# Patient Record
Sex: FEMALE | Race: WHITE | NOT HISPANIC OR LATINO
[De-identification: names, ages, dates, MRNs, and addresses within clinical notes are randomized per-mention and may not be internally consistent; named-entity substitution may affect disease eponyms.]

---

## 2018-02-26 ENCOUNTER — RESULT REVIEW (OUTPATIENT)
Age: 43
End: 2018-02-26

## 2018-08-09 PROBLEM — Z00.00 ENCOUNTER FOR PREVENTIVE HEALTH EXAMINATION: Status: ACTIVE | Noted: 2018-08-09

## 2018-08-10 ENCOUNTER — APPOINTMENT (OUTPATIENT)
Dept: INTERVENTIONAL RADIOLOGY/VASCULAR | Facility: HOSPITAL | Age: 43
End: 2018-08-10
Payer: COMMERCIAL

## 2018-08-10 DIAGNOSIS — Z78.9 OTHER SPECIFIED HEALTH STATUS: ICD-10-CM

## 2018-08-10 DIAGNOSIS — Z87.898 PERSONAL HISTORY OF OTHER SPECIFIED CONDITIONS: ICD-10-CM

## 2018-08-10 PROCEDURE — 99244 OFF/OP CNSLTJ NEW/EST MOD 40: CPT

## 2018-08-13 PROBLEM — Z78.9 DOES NOT USE TOBACCO: Status: ACTIVE | Noted: 2018-08-13

## 2018-08-13 PROBLEM — Z78.9 DENIES ALCOHOL CONSUMPTION: Status: ACTIVE | Noted: 2018-08-13

## 2018-08-13 PROBLEM — Z87.898 FAMILY HISTORY OF UTERINE LEIOMYOMA: Status: ACTIVE | Noted: 2018-08-13

## 2018-09-06 ENCOUNTER — APPOINTMENT (OUTPATIENT)
Dept: INTERVENTIONAL RADIOLOGY/VASCULAR | Facility: HOSPITAL | Age: 43
End: 2018-09-06

## 2018-09-06 ENCOUNTER — OUTPATIENT (OUTPATIENT)
Dept: OUTPATIENT SERVICES | Facility: HOSPITAL | Age: 43
LOS: 1 days | End: 2018-09-06
Payer: COMMERCIAL

## 2018-09-06 PROCEDURE — 37243 VASC EMBOLIZE/OCCLUDE ORGAN: CPT

## 2018-09-06 PROCEDURE — C1894: CPT

## 2018-09-06 PROCEDURE — C1889: CPT

## 2018-09-06 PROCEDURE — C1887: CPT

## 2018-09-06 PROCEDURE — 36247 INS CATH ABD/L-EXT ART 3RD: CPT

## 2018-09-06 PROCEDURE — 36247 INS CATH ABD/L-EXT ART 3RD: CPT | Mod: 59

## 2018-09-06 PROCEDURE — C1769: CPT

## 2018-09-06 RX ORDER — IBUPROFEN 200 MG
1 TABLET ORAL
Qty: 60 | Refills: 0
Start: 2018-09-06 | End: 2018-09-20

## 2018-09-06 RX ORDER — ONDANSETRON 8 MG/1
1 TABLET, FILM COATED ORAL
Qty: 15 | Refills: 0
Start: 2018-09-06 | End: 2018-09-10

## 2018-09-06 RX ORDER — OXYCODONE HYDROCHLORIDE 5 MG/1
1 TABLET ORAL
Qty: 30 | Refills: 0
Start: 2018-09-06 | End: 2018-09-10

## 2018-10-08 ENCOUNTER — APPOINTMENT (OUTPATIENT)
Dept: INTERVENTIONAL RADIOLOGY/VASCULAR | Facility: HOSPITAL | Age: 43
End: 2018-10-08
Payer: COMMERCIAL

## 2018-10-08 PROCEDURE — 99213 OFFICE O/P EST LOW 20 MIN: CPT

## 2019-01-10 ENCOUNTER — FORM ENCOUNTER (OUTPATIENT)
Age: 44
End: 2019-01-10

## 2019-01-11 ENCOUNTER — APPOINTMENT (OUTPATIENT)
Dept: INTERVENTIONAL RADIOLOGY/VASCULAR | Facility: HOSPITAL | Age: 44
End: 2019-01-11
Payer: COMMERCIAL

## 2019-01-11 ENCOUNTER — APPOINTMENT (OUTPATIENT)
Dept: MRI IMAGING | Facility: HOSPITAL | Age: 44
End: 2019-01-11
Payer: COMMERCIAL

## 2019-01-11 ENCOUNTER — OUTPATIENT (OUTPATIENT)
Dept: OUTPATIENT SERVICES | Facility: HOSPITAL | Age: 44
LOS: 1 days | End: 2019-01-11
Payer: COMMERCIAL

## 2019-01-11 PROCEDURE — 72197 MRI PELVIS W/O & W/DYE: CPT | Mod: 26

## 2019-01-11 PROCEDURE — 99213 OFFICE O/P EST LOW 20 MIN: CPT

## 2019-01-11 PROCEDURE — A9585: CPT

## 2019-01-11 PROCEDURE — 72197 MRI PELVIS W/O & W/DYE: CPT

## 2019-01-11 NOTE — HISTORY OF PRESENT ILLNESS
[FreeTextEntry1] : four months since UAE.  Previous symptoms bulk related symptoms have improved significantly.  MRI shows only slight decrease in size of the posterior exophytic mass.  Discussed with patient possible need for surgical resection.  Spoke to Dr Jamal Chinchilla 1/11/2018 at 1:53  and discussed MRI findings.  He is seeing Ms Lopez today and will discuss resection.

## 2019-02-25 ENCOUNTER — APPOINTMENT (OUTPATIENT)
Dept: GYNECOLOGIC ONCOLOGY | Facility: CLINIC | Age: 44
End: 2019-02-25
Payer: COMMERCIAL

## 2019-02-25 VITALS
WEIGHT: 165 LBS | OXYGEN SATURATION: 96 % | HEIGHT: 62 IN | BODY MASS INDEX: 30.36 KG/M2 | HEART RATE: 71 BPM | SYSTOLIC BLOOD PRESSURE: 115 MMHG | DIASTOLIC BLOOD PRESSURE: 80 MMHG

## 2019-02-25 PROCEDURE — 99203 OFFICE O/P NEW LOW 30 MIN: CPT

## 2019-02-25 NOTE — HISTORY OF PRESENT ILLNESS
[FreeTextEntry1] : Problem\par 1)\par \par Previous Therapy\par 1)D&C 7/30/18\par    a)Endocervix- Mucus, blood, scant trips of squamous epithelium\par    b)Endometrium- Fragments of benign endocervix. Only occasional strips surface endometrium are present. Endometrial tissue is not represented well enough for evaluation in this material. \par 2)S/p Uterine Artery Embolization (UAE) by Dr. Soren Conway for menorrhagia 2' to leiomyomata 9/6/18\par 3)Pap 1/11/19\par    a)NILM, HPV/HR negative \par 4)Ca-125= 21\par    CEA= <0.5 both from 1/11/19\par \par 43 y.o referred by Dr. Chinchilla presents today for consultation on hysterectomy. \par \par

## 2019-02-25 NOTE — PAST MEDICAL HISTORY
[Menarche Age ____] : age at menarche was [unfilled] [Definite ___ (Date)] : the last menstrual period was [unfilled] [Total Preg ___] : G[unfilled] [FreeTextEntry5] : vaginal delivery x1\par  x 1

## 2019-02-25 NOTE — PHYSICAL EXAM
[Normal] : Recto-Vaginal Exam: Normal [Abnormal] : Uterus: Abnormal [Fully active, able to carry on all pre-disease performance without restriction] : Status 0 - Fully active, able to carry on all pre-disease performance without restriction [de-identified] : low transverse incision.

## 2019-10-30 ENCOUNTER — APPOINTMENT (OUTPATIENT)
Dept: GYNECOLOGIC ONCOLOGY | Facility: CLINIC | Age: 44
End: 2019-10-30
Payer: COMMERCIAL

## 2019-10-30 PROCEDURE — 99215 OFFICE O/P EST HI 40 MIN: CPT

## 2019-10-31 LAB
ALBUMIN SERPL ELPH-MCNC: 5.3 G/DL
ALP BLD-CCNC: 38 U/L
ALT SERPL-CCNC: 11 U/L
ANION GAP SERPL CALC-SCNC: 18 MMOL/L
APTT BLD: 32.8 SEC
AST SERPL-CCNC: 16 U/L
BASOPHILS # BLD AUTO: 0.1 K/UL
BASOPHILS NFR BLD AUTO: 1.5 %
BILIRUB SERPL-MCNC: 0.5 MG/DL
BUN SERPL-MCNC: 13 MG/DL
CALCIUM SERPL-MCNC: 10.2 MG/DL
CHLORIDE SERPL-SCNC: 97 MMOL/L
CO2 SERPL-SCNC: 23 MMOL/L
CREAT SERPL-MCNC: 0.7 MG/DL
EOSINOPHIL # BLD AUTO: 0.31 K/UL
EOSINOPHIL NFR BLD AUTO: 4.5 %
GLUCOSE SERPL-MCNC: 97 MG/DL
HCT VFR BLD CALC: 44.6 %
HGB BLD-MCNC: 13.9 G/DL
IMM GRANULOCYTES NFR BLD AUTO: 0.3 %
INR PPP: 0.98 RATIO
LYMPHOCYTES # BLD AUTO: 2.28 K/UL
LYMPHOCYTES NFR BLD AUTO: 33.1 %
MAN DIFF?: NORMAL
MCHC RBC-ENTMCNC: 29.6 PG
MCHC RBC-ENTMCNC: 31.2 GM/DL
MCV RBC AUTO: 94.9 FL
MONOCYTES # BLD AUTO: 0.51 K/UL
MONOCYTES NFR BLD AUTO: 7.4 %
NEUTROPHILS # BLD AUTO: 3.66 K/UL
NEUTROPHILS NFR BLD AUTO: 53.2 %
PLATELET # BLD AUTO: 257 K/UL
POTASSIUM SERPL-SCNC: 4.2 MMOL/L
PROT SERPL-MCNC: 7.8 G/DL
PT BLD: 11.3 SEC
RBC # BLD: 4.7 M/UL
RBC # FLD: 12.7 %
SODIUM SERPL-SCNC: 138 MMOL/L
WBC # FLD AUTO: 6.88 K/UL

## 2019-11-05 NOTE — PHYSICAL EXAM
[Normal] : Recto-Vaginal Exam: Normal [Fully active, able to carry on all pre-disease performance without restriction] : Status 0 - Fully active, able to carry on all pre-disease performance without restriction [Mass ___ cm] : found to have a [unfilled] ~Ucm palpable mass [Abnormal] : Adnexa(ae): Abnormal [de-identified] : low transverse incision.

## 2019-11-05 NOTE — HISTORY OF PRESENT ILLNESS
[FreeTextEntry1] : Problem\par 1)Fibroids\par 2) Pelvic Mass\par \par Previous Therapy\par 1)D&C 7/30/18\par    a)Endocervix- Mucus, blood, scant trips of squamous epithelium\par    b)Endometrium- Fragments of benign endocervix. Only occasional strips surface endometrium are present. Endometrial tissue is not represented well enough for evaluation in this material. \par 2)S/p Uterine Artery Embolization (UAE) by Dr. Soren Conway for menorrhagia 2' to leiomyomata 9/6/18\par 3)Pap 1/11/19\par    a)NILM, HPV/HR negative \par 4)Ca-125= 21\par    CEA= <0.5 both from 1/11/19\par 5) [place] 3/[place]\par 6) CT Pelvis 10/15/19\par     a) large cystic structure in the L adnexa 10cm\par \par Here for follow up. Previously seen for fibroids, patient underwent myomectomy in [facility]. Now referred due to large adnexal mass. She has been feeling well for the most part and has been monitored closely with ultrasounds. 3 months ago she had bilateral small cysts but now has a large L ovarian cyst. Patient has some bloating and occasional abdominal pain and dyspareunia. Feeling well. \par

## 2019-11-05 NOTE — DISCUSSION/SUMMARY
[FreeTextEntry1] : I discussed with the patient with the aid of diagrams, reviewed the findings on history and physical examination, and reviewed the imaging studies in detail.  We reviewed the cystic and solid component of the mass, the CA-125, and other tumor markers. ACOG management of adnexal masses has been reviewed. \par \par We discussed the differential diagnosis which includes benign, low malignant potential tumors and malignancy. Other etiologies of adnexal masses, such as metastatic disease from another organ sites also discussed.\par \par In the case of symptomatic cysts, or those with findings concerning for malignancy, the recommendation is for surgical removal. Again, with the aid of diagrams, different surgical approaches were discussed including minimally invasive and open approaches.\par \par Laparoscopic cystectomy vs. unilateral salpingoophorectomy discussed. Increased risk of cyst rupture with ovarian cystectomy explained. If findings grossly concerning for malignancy a frozen section will be performed; and the appropriate additional management including but not limited to pelvic and paraaortic lymph node dissection, omentectomy and appendectomy. It was explained that in the case of a high-grade malignancy removal of the uterus and other ovary is recommended. NCCN guidelines discussed.  Possible laparotomy also discussed. If diagnosis is not clear on frozen section, we will avoid any additional procedures and defer to permanent pathology. This may require additional surgery and the patient states understanding this. \par \par Complications that include, but are not limited to: bleeding, infection, injury to other organs including bowel, bladder, ureters, blood vessels, nerves; infections, blood clots, lymphedema, pneumonia, wound complications and prolonged hospital stay have all been discussed with the patient. Whenever minimally invasive surgery is attempted, there is a chance of needing to convert to laparotomy. Staging procedure discussed in detail in the event of a malignant/abnormal finding.  The risk of occult injury requiring additional surgery also discussed. I have also provided her with the diagrams.  \par \par Surgical scheduling was discussed and instructions for optimization prior to surgery were given. will follow the Enhanced Recovery After Surgery (ERAS) protocol.  \par No aspirin or NSAID products for 1 week prior. \par She will choose a surgical date.\par Medical clearance. \par \par The total encounter time was 60  minutes, of which over 50% was spent face-to-face, examining and counseling the patient, or coordinating care.

## 2019-11-25 ENCOUNTER — APPOINTMENT (OUTPATIENT)
Dept: GYNECOLOGIC ONCOLOGY | Facility: CLINIC | Age: 44
End: 2019-11-25
Payer: COMMERCIAL

## 2019-11-25 VITALS
BODY MASS INDEX: 28.34 KG/M2 | HEART RATE: 86 BPM | OXYGEN SATURATION: 97 % | SYSTOLIC BLOOD PRESSURE: 110 MMHG | WEIGHT: 154 LBS | HEIGHT: 62 IN | DIASTOLIC BLOOD PRESSURE: 80 MMHG

## 2019-11-25 PROCEDURE — 99215 OFFICE O/P EST HI 40 MIN: CPT

## 2019-11-25 NOTE — DISCUSSION/SUMMARY
[FreeTextEntry1] : Spoke with patient and answered questions to her satisfaction\par Will proceed with diagnostic laparoscopy, bilateral salpingectomy, USO\par Possible laparotomy and possible surgical staging discussed in detail\par If cancer is found, patient desires complete staging at time of surgery\par

## 2019-11-25 NOTE — HISTORY OF PRESENT ILLNESS
[FreeTextEntry1] : Problem\par 1)Fibroids\par 2) Pelvic Mass\par \par Previous Therapy\par 1)D&C 7/30/18\par    a)Endocervix- Mucus, blood, scant trips of squamous epithelium\par    b)Endometrium- Fragments of benign endocervix. Only occasional strips surface endometrium are present. Endometrial tissue is not represented well enough for evaluation in this material. \par 2)S/p Uterine Artery Embolization (UAE) by Dr. Soren Conway for menorrhagia 2' to leiomyomata 9/6/18\par 3)Pap 1/11/19\par    a)NILM, HPV/HR negative \par 4)Ca-125= 21\par    CEA= <0.5 both from 1/11/19\par 5) [place] 3/[place]\par 6) CT Pelvis 10/15/19\par     a) large cystic structure in the L adnexa 10cm\par \par Here to discuss surgery. Has more questions regarding approach and recovery. \par

## 2019-11-26 LAB
CANCER AG125 SERPL-ACNC: 9 U/ML
CEA SERPL-MCNC: 0.9 NG/ML

## 2019-12-04 NOTE — ASU PATIENT PROFILE, ADULT - PSH
History of appendectomy    History of     History of foot surgery  right as a child  History of left knee surgery    History of myomectomy    History of surgery  embolization of fibroids

## 2019-12-05 ENCOUNTER — OUTPATIENT (OUTPATIENT)
Dept: OUTPATIENT SERVICES | Facility: HOSPITAL | Age: 44
LOS: 1 days | Discharge: HOME | End: 2019-12-05
Payer: COMMERCIAL

## 2019-12-05 ENCOUNTER — APPOINTMENT (OUTPATIENT)
Dept: GYNECOLOGIC ONCOLOGY | Facility: HOSPITAL | Age: 44
End: 2019-12-05

## 2019-12-05 VITALS
OXYGEN SATURATION: 98 % | SYSTOLIC BLOOD PRESSURE: 115 MMHG | DIASTOLIC BLOOD PRESSURE: 62 MMHG | RESPIRATION RATE: 18 BRPM

## 2019-12-05 VITALS
SYSTOLIC BLOOD PRESSURE: 116 MMHG | OXYGEN SATURATION: 100 % | TEMPERATURE: 99 F | HEART RATE: 97 BPM | WEIGHT: 145.73 LBS | HEIGHT: 62 IN | DIASTOLIC BLOOD PRESSURE: 79 MMHG | RESPIRATION RATE: 18 BRPM

## 2019-12-05 DIAGNOSIS — Z98.890 OTHER SPECIFIED POSTPROCEDURAL STATES: Chronic | ICD-10-CM

## 2019-12-05 DIAGNOSIS — Z98.891 HISTORY OF UTERINE SCAR FROM PREVIOUS SURGERY: Chronic | ICD-10-CM

## 2019-12-05 DIAGNOSIS — Z90.49 ACQUIRED ABSENCE OF OTHER SPECIFIED PARTS OF DIGESTIVE TRACT: Chronic | ICD-10-CM

## 2019-12-05 LAB — GLUCOSE BLDC GLUCOMTR-MCNC: 103 MG/DL — HIGH (ref 70–99)

## 2019-12-05 PROCEDURE — 58660 LAPAROSCOPY LYSIS: CPT | Mod: 22

## 2019-12-05 RX ORDER — GABAPENTIN 400 MG/1
600 CAPSULE ORAL ONCE
Refills: 0 | Status: COMPLETED | OUTPATIENT
Start: 2019-12-05 | End: 2019-12-05

## 2019-12-05 RX ORDER — HYDROMORPHONE HYDROCHLORIDE 2 MG/ML
0.5 INJECTION INTRAMUSCULAR; INTRAVENOUS; SUBCUTANEOUS EVERY 4 HOURS
Refills: 0 | Status: DISCONTINUED | OUTPATIENT
Start: 2019-12-05 | End: 2019-12-06

## 2019-12-05 RX ORDER — OXYCODONE AND ACETAMINOPHEN 5; 325 MG/1; MG/1
1 TABLET ORAL EVERY 4 HOURS
Refills: 0 | Status: DISCONTINUED | OUTPATIENT
Start: 2019-12-05 | End: 2019-12-06

## 2019-12-05 RX ORDER — METOCLOPRAMIDE HCL 10 MG
10 TABLET ORAL ONCE
Refills: 0 | Status: DISCONTINUED | OUTPATIENT
Start: 2019-12-05 | End: 2019-12-06

## 2019-12-05 RX ORDER — METOCLOPRAMIDE HCL 10 MG
10 TABLET ORAL ONCE
Refills: 0 | Status: COMPLETED | OUTPATIENT
Start: 2019-12-05 | End: 2019-12-05

## 2019-12-05 RX ORDER — CELECOXIB 200 MG/1
400 CAPSULE ORAL ONCE
Refills: 0 | Status: COMPLETED | OUTPATIENT
Start: 2019-12-05 | End: 2019-12-05

## 2019-12-05 RX ORDER — ACETAMINOPHEN 500 MG
1000 TABLET ORAL ONCE
Refills: 0 | Status: COMPLETED | OUTPATIENT
Start: 2019-12-05 | End: 2019-12-05

## 2019-12-05 RX ORDER — ONDANSETRON 8 MG/1
4 TABLET, FILM COATED ORAL EVERY 6 HOURS
Refills: 0 | Status: DISCONTINUED | OUTPATIENT
Start: 2019-12-05 | End: 2019-12-06

## 2019-12-05 RX ORDER — KETOROLAC TROMETHAMINE 30 MG/ML
30 SYRINGE (ML) INJECTION ONCE
Refills: 0 | Status: DISCONTINUED | OUTPATIENT
Start: 2019-12-05 | End: 2019-12-06

## 2019-12-05 RX ORDER — HEPARIN SODIUM 5000 [USP'U]/ML
5000 INJECTION INTRAVENOUS; SUBCUTANEOUS ONCE
Refills: 0 | Status: COMPLETED | OUTPATIENT
Start: 2019-12-05 | End: 2019-12-05

## 2019-12-05 RX ADMIN — Medication 1000 MILLIGRAM(S): at 12:00

## 2019-12-05 RX ADMIN — CELECOXIB 400 MILLIGRAM(S): 200 CAPSULE ORAL at 12:00

## 2019-12-05 RX ADMIN — Medication 1000 MILLIGRAM(S): at 16:05

## 2019-12-05 RX ADMIN — Medication 10 MILLIGRAM(S): at 19:57

## 2019-12-05 RX ADMIN — HEPARIN SODIUM 5000 UNIT(S): 5000 INJECTION INTRAVENOUS; SUBCUTANEOUS at 12:00

## 2019-12-05 RX ADMIN — CELECOXIB 400 MILLIGRAM(S): 200 CAPSULE ORAL at 16:05

## 2019-12-05 RX ADMIN — GABAPENTIN 600 MILLIGRAM(S): 400 CAPSULE ORAL at 12:00

## 2019-12-05 NOTE — BRIEF OPERATIVE NOTE - OPERATION/FINDINGS
Significant adhesions between bowel and posterior surface of uterus.  Omental adhesions to anterior abdominal wall.  Lysis of adhesions performed in left pelvic sidewall revealing cystic collection of clear fluid formed by abdominal adhesions; no cyst wall.  Within this cavity normal left ovary and tube were visualized.  Right ovary and tube not visualized due to bowel adhesions, however normal on preoperative imaging.

## 2019-12-05 NOTE — PACU DISCHARGE NOTE - COMMENTS
Pt discharged to home. Discharge paperwork and instructions given to pt and pt's daughter. Iv heplock removed. Safety protocol maintained.

## 2019-12-05 NOTE — PROGRESS NOTE ADULT - ASSESSMENT
44y Female POD# 0  s/p CYN and evacuation of pelvic cytic fluid                                        1. Neuro/Pain: per PACU  2  CV:   VS per routine  3. Pulm: Encourage ISS  4. GI: sips and ADAT, zofran and reglan in pacu prn for nausea   5. : voiding  6. Heme: ---  7. ID: --  8. DVT ppx: SCDs, early ambulation  9. Dispo: Likely POD#0 form PACU

## 2019-12-05 NOTE — PROGRESS NOTE ADULT - SUBJECTIVE AND OBJECTIVE BOX
GYN Post Op Check     Patient seen at bedside. Reporting intermittent nausea after eating crackers with one episode of emesis 2 hours ago, no s/p zofran and feeling better. Patient would lke some more anti emetics prior to getting in car. Tolerating ice chips now.     Pain controlled. Tolerating chips and sips.  Ambulating and voiding     Denies CP, palpitations, SOB, fever, chills, nausea, vomiting.    Vital Signs Last 24 Hrs  T(C): 37.1 (05 Dec 2019 15:05), Max: 37.1 (05 Dec 2019 11:37)  T(F): 98.7 (05 Dec 2019 15:05), Max: 98.7 (05 Dec 2019 11:37)  HR: 82 (05 Dec 2019 17:17) (60 - 97)  BP: 115/62 (05 Dec 2019 18:33) (110/60 - 128/67)  BP(mean): 79 (05 Dec 2019 17:17) (78 - 91)  RR: 18 (05 Dec 2019 18:33) (17 - 20)  SpO2: 98% (05 Dec 2019 18:33) (98% - 100%)    Physical Exam:  Gen: No Acute Distress  Pulm: Clear to auscultation bilaterally  GI: soft, appropriately tender, mildly distended, decreased BS, no rebound, no guarding.  Dressing C/D/I  : Leonard in place  Ext: SCDs in place, wnl    I&O's Summary    MEDICATIONS  (STANDING):  metoclopramide Injectable 10 milliGRAM(s) IV Push once    MEDICATIONS  (PRN):  HYDROmorphone  Injectable 0.5 milliGRAM(s) IV Push every 4 hours PRN Severe Pain (7 - 10)  ketorolac   Injectable 30 milliGRAM(s) IV Push once PRN Moderate Pain (4 - 6)  metoclopramide Injectable 10 milliGRAM(s) IV Push once PRN Nausea and/or Vomiting  ondansetron Injectable 4 milliGRAM(s) IV Push every 6 hours PRN Nausea  oxycodone    5 mG/acetaminophen 325 mG 1 Tablet(s) Oral every 4 hours PRN Moderate Pain (4 - 6)    Allergies    No Known Allergies    Intolerances        LABS:

## 2019-12-06 PROBLEM — D21.9 BENIGN NEOPLASM OF CONNECTIVE AND OTHER SOFT TISSUE, UNSPECIFIED: Chronic | Status: ACTIVE | Noted: 2019-12-04

## 2019-12-06 PROBLEM — R19.00 INTRA-ABDOMINAL AND PELVIC SWELLING, MASS AND LUMP, UNSPECIFIED SITE: Chronic | Status: ACTIVE | Noted: 2019-12-04

## 2019-12-06 PROBLEM — E78.5 HYPERLIPIDEMIA, UNSPECIFIED: Chronic | Status: ACTIVE | Noted: 2019-12-04

## 2019-12-06 PROCEDURE — 49329 UNLSTD LAPS PX ABD PERTM&OMN: CPT

## 2019-12-06 PROCEDURE — 82962 GLUCOSE BLOOD TEST: CPT

## 2019-12-06 PROCEDURE — 86901 BLOOD TYPING SEROLOGIC RH(D): CPT

## 2019-12-06 PROCEDURE — 86850 RBC ANTIBODY SCREEN: CPT

## 2019-12-06 PROCEDURE — 86900 BLOOD TYPING SEROLOGIC ABO: CPT

## 2019-12-12 ENCOUNTER — APPOINTMENT (OUTPATIENT)
Dept: GYNECOLOGIC ONCOLOGY | Facility: CLINIC | Age: 44
End: 2019-12-12
Payer: COMMERCIAL

## 2019-12-12 VITALS
HEIGHT: 62 IN | SYSTOLIC BLOOD PRESSURE: 117 MMHG | DIASTOLIC BLOOD PRESSURE: 84 MMHG | BODY MASS INDEX: 27.97 KG/M2 | WEIGHT: 152 LBS

## 2019-12-12 PROCEDURE — 99024 POSTOP FOLLOW-UP VISIT: CPT

## 2019-12-12 NOTE — HISTORY OF PRESENT ILLNESS
[FreeTextEntry1] : Problem\par 1)Fibroids\par 2) Pelvic Mass\par \par Previous Therapy\par 1)D&C 7/30/18\par    a)Endocervix- Mucus, blood, scant trips of squamous epithelium\par    b)Endometrium- Fragments of benign endocervix. Only occasional strips surface endometrium are present. Endometrial tissue is not represented well enough for evaluation in this material. \par 2)S/p Uterine Artery Embolization (UAE) by Dr. Soren Conway for menorrhagia 2' to leiomyomata 9/6/18\par 3)Pap 1/11/19\par    a)NILM, HPV/HR negative \par 4)Ca-125= 21\par    CEA= <0.5 both from 1/11/19\par 5) [place] 3[place]\par 6) CT Pelvis 10/15/19\par     a) large cystic structure in the L adnexa 10cm\par 7) PATHOLOGY PENDING \par \par \par

## 2019-12-12 NOTE — PHYSICAL EXAM
[Fully active, able to carry on all pre-disease performance without restriction] : Status 0 - Fully active, able to carry on all pre-disease performance without restriction [Normal] : Description of patient's judgment and insight: Normal [de-identified] : C [de-identified] : CTA BL, no wheezing or rales BL [de-identified] : Normal s1 and s2 [de-identified] : Incision sites healing well. No signs of infection. +BS, soft, non tender

## 2019-12-12 NOTE — REVIEW OF SYSTEMS
[Negative] : Musculoskeletal [Syncope] : no syncope noted [Hematuria] : no hematuria [Dysuria] : no dysuria [Vaginal Discharge] : no vaginal discharge [Abn Vag Bleeding] : no abnormal vaginal bleeding [Chest Pain] : no chest pain [Orthopnea] : no orthopnea [Wheezing] : no wheezing [CANTRELL] : no dyspnea on exertion [SOB on Exertion] : no shortness of breath during exertion [Bloody Stools] : no bloody stools [Nausea] : no nausea/vomitting

## 2019-12-12 NOTE — REASON FOR VISIT
[FreeTextEntry1] : 1 week post op check. Pt reports she is feeling well. Pt denies fever, chills or abdominal pain. Pt reports she is currently not taking anything for pain.

## 2019-12-12 NOTE — DISCUSSION/SUMMARY
[FreeTextEntry1] : -Pathology results still pending. Pt notified that we will call her asap with the results\par -Patient healing well\par -Postoperative precautions given\par -Follow up in 3 weeks for vaginal cuff evaluation

## 2020-01-06 ENCOUNTER — APPOINTMENT (OUTPATIENT)
Dept: GYNECOLOGIC ONCOLOGY | Facility: CLINIC | Age: 45
End: 2020-01-06

## 2020-01-06 ENCOUNTER — APPOINTMENT (OUTPATIENT)
Dept: GYNECOLOGIC ONCOLOGY | Facility: CLINIC | Age: 45
End: 2020-01-06
Payer: COMMERCIAL

## 2020-01-06 VITALS
DIASTOLIC BLOOD PRESSURE: 70 MMHG | HEIGHT: 62 IN | WEIGHT: 152 LBS | HEART RATE: 77 BPM | OXYGEN SATURATION: 96 % | SYSTOLIC BLOOD PRESSURE: 110 MMHG | BODY MASS INDEX: 27.97 KG/M2

## 2020-01-06 PROCEDURE — 99024 POSTOP FOLLOW-UP VISIT: CPT

## 2020-01-06 RX ORDER — OXYCODONE AND ACETAMINOPHEN 5; 325 MG/1; MG/1
5-325 TABLET ORAL
Qty: 15 | Refills: 0 | Status: COMPLETED | COMMUNITY
Start: 2019-12-04 | End: 2020-01-06

## 2020-01-06 RX ORDER — DOCUSATE SODIUM 100 MG/1
100 CAPSULE ORAL TWICE DAILY
Qty: 60 | Refills: 0 | Status: COMPLETED | COMMUNITY
Start: 2019-12-04 | End: 2020-01-06

## 2020-01-06 RX ORDER — IBUPROFEN 800 MG/1
800 TABLET, FILM COATED ORAL 3 TIMES DAILY
Qty: 45 | Refills: 0 | Status: COMPLETED | COMMUNITY
Start: 2019-12-04 | End: 2020-01-06

## 2020-01-06 NOTE — PHYSICAL EXAM
[Fully active, able to carry on all pre-disease performance without restriction] : Status 0 - Fully active, able to carry on all pre-disease performance without restriction [Normal] : Bimanual Exam: Normal [de-identified] : CTA BL, no wheezing or rales BL [de-identified] : Normal s1 and s2

## 2020-01-06 NOTE — REVIEW OF SYSTEMS
[Negative] : Musculoskeletal [Syncope] : no syncope noted [Hematuria] : no hematuria [Dysuria] : no dysuria [Vaginal Discharge] : no vaginal discharge [Abn Vag Bleeding] : no abnormal vaginal bleeding [Chest Pain] : no chest pain [Orthopnea] : no orthopnea [CANTRELL] : no dyspnea on exertion [Wheezing] : no wheezing [SOB on Exertion] : no shortness of breath during exertion [Bloody Stools] : no bloody stools [Nausea] : no nausea/vomitting

## 2020-08-10 ENCOUNTER — APPOINTMENT (OUTPATIENT)
Dept: GYNECOLOGIC ONCOLOGY | Facility: CLINIC | Age: 45
End: 2020-08-10
Payer: COMMERCIAL

## 2020-08-10 ENCOUNTER — TRANSCRIPTION ENCOUNTER (OUTPATIENT)
Age: 45
End: 2020-08-10

## 2020-08-10 VITALS
HEIGHT: 62 IN | HEART RATE: 82 BPM | OXYGEN SATURATION: 98 % | WEIGHT: 147 LBS | BODY MASS INDEX: 27.05 KG/M2 | SYSTOLIC BLOOD PRESSURE: 102 MMHG | DIASTOLIC BLOOD PRESSURE: 72 MMHG

## 2020-08-10 DIAGNOSIS — D25.9 LEIOMYOMA OF UTERUS, UNSPECIFIED: ICD-10-CM

## 2020-08-10 DIAGNOSIS — Z98.890 OTHER SPECIFIED POSTPROCEDURAL STATES: ICD-10-CM

## 2020-08-10 DIAGNOSIS — R19.00 INTRA-ABDOMINAL AND PELVIC SWELLING, MASS AND LUMP, UNSPECIFIED SITE: ICD-10-CM

## 2020-08-10 DIAGNOSIS — L72.0 EPIDERMAL CYST: ICD-10-CM

## 2020-08-10 PROCEDURE — 99214 OFFICE O/P EST MOD 30 MIN: CPT

## 2020-08-10 NOTE — PHYSICAL EXAM
[Normal] : Bimanual Exam: Normal [Fully active, able to carry on all pre-disease performance without restriction] : Status 0 - Fully active, able to carry on all pre-disease performance without restriction [Abnormal] : Adnexa(ae): Abnormal [de-identified] : CTA BL, no wheezing or rales BL [de-identified] : healed LPSY and transverse incision [de-identified] : Normal s1 and s2

## 2020-08-10 NOTE — DISCUSSION/SUMMARY
[Discuss Alternatives/Risks/Benefits w/Patient] : All alternatives, risks, and benefits were discussed with the patient/family and all questions were answered.  Patient expressed good understanding and appreciates the importance of follow up as recommended. [FreeTextEntry1] : PROBABLE PERITONEAL INCLUSION CYST IN A PATIENT WHO HAS PRIOR SURGERY AND ENDOMETRIOSIS\par \par -PATIENT will need gull hysterectomy if she is to have surgery bc her adhesions are veery severe, she is likely to have recurrence of her inclusion cysts.  \par -my recommendation is for the above if she is symptomatic.\par -if she is not symptomatic she can expectantly manage.\par -she will need body imaging prior to any surgery.\par -patient is leaning toward surgery but is afraid to do it now with covid and so she will use expectant management for now

## 2020-08-10 NOTE — HISTORY OF PRESENT ILLNESS
[FreeTextEntry1] : Problem\par 1)Fibroids\par 2) Pelvic Mass\par \par Previous Therapy\par 1)D&C 7/30/18\par    a)Endocervix- Mucus, blood, scant trips of squamous epithelium\par    b)Endometrium- Fragments of benign endocervix. Only occasional strips surface endometrium are present. Endometrial tissue is not represented well enough for evaluation in this material. \par 2)S/p Uterine Artery Embolization (UAE) by Dr. Soren Conway for menorrhagia 2' to leiomyomata 9/6/18\par 3)Pap 1/11/19\par    a)NILM, HPV/HR negative \par 4)Ca-125= 21\par    CEA= <0.5 both from 1/11/19\par 5) [place] 3[place]\par 6) CT Pelvis 10/15/19\par     a) large cystic structure in the L adnexa 10cm\par 7) Laparoscopic lysis of adhesions and evacuation of peritoneal inclusion cyst\par    a) no pathology sent\par \par Here for follow up, she saw Dr. Salas who did an in office US and referred her back her as the inclusion cyst is back and the same size. Records not available. Patient is feeling well but has pain with sex and bending. \par \par \par

## 2020-08-10 NOTE — REVIEW OF SYSTEMS
[Negative] : Musculoskeletal [Hematuria] : no hematuria [Syncope] : no syncope noted [Dysuria] : no dysuria [Abn Vag Bleeding] : no abnormal vaginal bleeding [Vaginal Discharge] : no vaginal discharge [Chest Pain] : no chest pain [Normal Sexual Function] : abnormal sexual function [CANTRELL] : no dyspnea on exertion [Orthopnea] : no orthopnea [Wheezing] : no wheezing [Bloody Stools] : no bloody stools [SOB on Exertion] : no shortness of breath during exertion [Nausea] : no nausea/vomitting [FreeTextEntry4] : pain with beding and sex

## 2022-01-02 NOTE — ASU PATIENT PROFILE, ADULT - PT NEEDS ASSIST
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 Patient currently takes Lamotrigine ER 400mg qd and also takes Vyvanse 30mg qAM PRN for when she has episodes of hypersomnia. Current episode started in the evening of 1/1/2022, prompting call to Neurology and send-in for IV solumedrol treatment, that has worked in the past (see 6/2021 documentation of similar presentation). Parents deny any recent travel or sick contacts, URI symptoms, vomiting, diarrhea, rashes, etc. Endorses these episodes to present with similar semiology each time and this episode is consistent with her usual attacks. Patient was able to take Vyvanse this AM and brought in for infusion.   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 She receives infusion every 3 months in varied settings, including at home or in ER. Last session was in October.    Received 1 g Solumedrol and GI PPX.   no

## 2023-09-27 NOTE — PHYSICAL EXAM
Detail Level: Detailed Detail Level: Zone [Mass ___ cm] : found to have a [unfilled] ~Ucm palpable mass [Abnormal] : Adnexa(ae): Abnormal [Normal] : Recto-Vaginal Exam: Normal [Fully active, able to carry on all pre-disease performance without restriction] : Status 0 - Fully active, able to carry on all pre-disease performance without restriction [de-identified] : low transverse incision.

## 2023-10-30 ENCOUNTER — APPOINTMENT (OUTPATIENT)
Dept: GYNECOLOGIC ONCOLOGY | Facility: CLINIC | Age: 48
End: 2023-10-30

## 2023-11-29 ENCOUNTER — TRANSCRIPTION ENCOUNTER (OUTPATIENT)
Age: 48
End: 2023-11-29

## 2023-11-29 NOTE — ASU PATIENT PROFILE, ADULT - NSICDXPASTMEDICALHX_GEN_ALL_CORE_FT
PAST MEDICAL HISTORY:  Fibroid     Hashimoto's thyroiditis     HLD (hyperlipidemia)     Pelvic mass

## 2023-11-29 NOTE — ASU PATIENT PROFILE, ADULT - FALL HARM RISK - HARM RISK INTERVENTIONS
Patient Education   Personalized Prevention Plan  You are due for the preventive services outlined below.  Your care team is available to assist you in scheduling these services.  If you have already completed any of these items, please share that information with your care team to update in your medical record.  Health Maintenance Due   Topic Date Due    Heart Failure Action Plan  Never done    Migraine Action Plan  Never done    Zoster (Shingles) Vaccine (1 of 2) Never done    Annual Wellness Visit  12/11/2021    FALL RISK ASSESSMENT  12/11/2021    ANNUAL REVIEW OF HM ORDERS  12/16/2021    Cholesterol Lab  12/17/2021    Basic Metabolic Panel  02/14/2022     Diclofenac gel            Communicate Risk of Fall with Harm to all staff/Reinforce activity limits and safety measures with patient and family/Tailored Fall Risk Interventions/Visual Cue: Yellow wristband and red socks/Bed in lowest position, wheels locked, appropriate side rails in place/Call bell, personal items and telephone in reach/Instruct patient to call for assistance before getting out of bed or chair/Non-slip footwear when patient is out of bed/[place] to call system/Physically safe environment - no spills, clutter or unnecessary equipment/Purposeful Proactive Rounding/Room/bathroom lighting operational, light cord in reach

## 2023-11-29 NOTE — ASU PATIENT PROFILE, ADULT - NSICDXPASTSURGICALHX_GEN_ALL_CORE_FT
PAST SURGICAL HISTORY:  History of appendectomy     History of      History of foot surgery right as a child    History of left knee surgery     History of myomectomy x2    History of surgery embolization of fibroids

## 2023-11-30 ENCOUNTER — TRANSCRIPTION ENCOUNTER (OUTPATIENT)
Age: 48
End: 2023-11-30

## 2023-11-30 ENCOUNTER — INPATIENT (INPATIENT)
Facility: HOSPITAL | Age: 48
LOS: 1 days | Discharge: HOME | DRG: 743 | End: 2023-12-02
Attending: OBSTETRICS & GYNECOLOGY | Admitting: OBSTETRICS & GYNECOLOGY
Payer: COMMERCIAL

## 2023-11-30 VITALS
RESPIRATION RATE: 16 BRPM | OXYGEN SATURATION: 97 % | TEMPERATURE: 97 F | WEIGHT: 163.58 LBS | HEIGHT: 61 IN | HEART RATE: 89 BPM | SYSTOLIC BLOOD PRESSURE: 116 MMHG | DIASTOLIC BLOOD PRESSURE: 78 MMHG

## 2023-11-30 DIAGNOSIS — N83.201 UNSPECIFIED OVARIAN CYST, RIGHT SIDE: ICD-10-CM

## 2023-11-30 DIAGNOSIS — Z98.890 OTHER SPECIFIED POSTPROCEDURAL STATES: Chronic | ICD-10-CM
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
## 2023-11-30 DIAGNOSIS — Z79.890 HORMONE REPLACEMENT THERAPY: ICD-10-CM
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
## 2023-11-30 DIAGNOSIS — N94.89 OTHER SPECIFIED CONDITIONS ASSOCIATED WITH FEMALE GENITAL ORGANS AND MENSTRUAL CYCLE: ICD-10-CM

## 2023-11-30 DIAGNOSIS — G89.29 OTHER CHRONIC PAIN: ICD-10-CM
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
## 2023-11-30 DIAGNOSIS — S05.00XA INJURY OF CONJUNCTIVA AND CORNEAL ABRASION WITHOUT FOREIGN BODY, UNSPECIFIED EYE, INITIAL ENCOUNTER: ICD-10-CM

## 2023-11-30 DIAGNOSIS — E78.5 HYPERLIPIDEMIA, UNSPECIFIED: ICD-10-CM

## 2023-11-30 DIAGNOSIS — E06.3 AUTOIMMUNE THYROIDITIS: ICD-10-CM

## 2023-11-30 DIAGNOSIS — Z90.49 ACQUIRED ABSENCE OF OTHER SPECIFIED PARTS OF DIGESTIVE TRACT: Chronic | ICD-10-CM

## 2023-11-30 DIAGNOSIS — Z98.891 HISTORY OF UTERINE SCAR FROM PREVIOUS SURGERY: Chronic | ICD-10-CM

## 2023-11-30 DIAGNOSIS — R10.2 PELVIC AND PERINEAL PAIN: ICD-10-CM

## 2023-11-30 DIAGNOSIS — N73.6 FEMALE PELVIC PERITONEAL ADHESIONS (POSTINFECTIVE): ICD-10-CM

## 2023-11-30 DIAGNOSIS — N80.03 ADENOMYOSIS OF THE UTERUS: ICD-10-CM

## 2023-11-30 DIAGNOSIS — Z90.49 ACQUIRED ABSENCE OF OTHER SPECIFIED PARTS OF DIGESTIVE TRACT: ICD-10-CM

## 2023-11-30 DIAGNOSIS — K66.8 OTHER SPECIFIED DISORDERS OF PERITONEUM: ICD-10-CM

## 2023-11-30 DIAGNOSIS — T80.89XA OTHER COMPLICATIONS FOLLOWING INFUSION, TRANSFUSION AND THERAPEUTIC INJECTION, INITIAL ENCOUNTER: ICD-10-CM

## 2023-11-30 LAB
BLD GP AB SCN SERPL QL: NEGATIVE — SIGNIFICANT CHANGE UP
BLD GP AB SCN SERPL QL: NEGATIVE — SIGNIFICANT CHANGE UP
RH IG SCN BLD-IMP: POSITIVE — SIGNIFICANT CHANGE UP
RH IG SCN BLD-IMP: POSITIVE — SIGNIFICANT CHANGE UP

## 2023-11-30 PROCEDURE — 88305 TISSUE EXAM BY PATHOLOGIST: CPT | Mod: 26

## 2023-11-30 PROCEDURE — 88307 TISSUE EXAM BY PATHOLOGIST: CPT | Mod: 26

## 2023-11-30 PROCEDURE — 88304 TISSUE EXAM BY PATHOLOGIST: CPT | Mod: 26

## 2023-11-30 RX ORDER — METOCLOPRAMIDE HCL 10 MG
10 TABLET ORAL ONCE
Refills: 0 | Status: DISCONTINUED | OUTPATIENT
Start: 2023-11-30 | End: 2023-11-30

## 2023-11-30 RX ORDER — HEPARIN SODIUM 5000 [USP'U]/ML
5000 INJECTION INTRAVENOUS; SUBCUTANEOUS EVERY 12 HOURS
Refills: 0 | Status: DISCONTINUED | OUTPATIENT
Start: 2023-11-30 | End: 2023-11-30

## 2023-11-30 RX ORDER — ATORVASTATIN CALCIUM 80 MG/1
20 TABLET, FILM COATED ORAL AT BEDTIME
Refills: 0 | Status: DISCONTINUED | OUTPATIENT
Start: 2023-11-30 | End: 2023-12-02

## 2023-11-30 RX ORDER — OXYCODONE HYDROCHLORIDE 5 MG/1
10 TABLET ORAL EVERY 4 HOURS
Refills: 0 | Status: DISCONTINUED | OUTPATIENT
Start: 2023-11-30 | End: 2023-12-02

## 2023-11-30 RX ORDER — KETOROLAC TROMETHAMINE 30 MG/ML
15 SYRINGE (ML) INJECTION EVERY 6 HOURS
Refills: 0 | Status: DISCONTINUED | OUTPATIENT
Start: 2023-11-30 | End: 2023-12-01

## 2023-11-30 RX ORDER — HYDROMORPHONE HYDROCHLORIDE 2 MG/ML
0.5 INJECTION INTRAMUSCULAR; INTRAVENOUS; SUBCUTANEOUS
Refills: 0 | Status: DISCONTINUED | OUTPATIENT
Start: 2023-11-30 | End: 2023-12-02

## 2023-11-30 RX ORDER — ATORVASTATIN CALCIUM 80 MG/1
1 TABLET, FILM COATED ORAL
Qty: 0 | Refills: 0 | DISCHARGE

## 2023-11-30 RX ORDER — ACETAMINOPHEN 500 MG
1000 TABLET ORAL EVERY 6 HOURS
Refills: 0 | Status: DISCONTINUED | OUTPATIENT
Start: 2023-11-30 | End: 2023-12-02

## 2023-11-30 RX ORDER — CELECOXIB 200 MG/1
400 CAPSULE ORAL ONCE
Refills: 0 | Status: COMPLETED | OUTPATIENT
Start: 2023-11-30 | End: 2023-11-30

## 2023-11-30 RX ORDER — FENTANYL CITRATE 50 UG/ML
25 INJECTION INTRAVENOUS
Refills: 0 | Status: DISCONTINUED | OUTPATIENT
Start: 2023-11-30 | End: 2023-11-30

## 2023-11-30 RX ORDER — SODIUM CHLORIDE 9 MG/ML
1000 INJECTION, SOLUTION INTRAVENOUS
Refills: 0 | Status: DISCONTINUED | OUTPATIENT
Start: 2023-11-30 | End: 2023-11-30

## 2023-11-30 RX ORDER — LEVOTHYROXINE SODIUM 125 MCG
125 TABLET ORAL DAILY
Refills: 0 | Status: DISCONTINUED | OUTPATIENT
Start: 2023-11-30 | End: 2023-12-02

## 2023-11-30 RX ORDER — HEPARIN SODIUM 5000 [USP'U]/ML
5000 INJECTION INTRAVENOUS; SUBCUTANEOUS ONCE
Refills: 0 | Status: COMPLETED | OUTPATIENT
Start: 2023-11-30 | End: 2023-11-30

## 2023-11-30 RX ORDER — ACETAMINOPHEN 500 MG
1000 TABLET ORAL ONCE
Refills: 0 | Status: COMPLETED | OUTPATIENT
Start: 2023-11-30 | End: 2023-11-30

## 2023-11-30 RX ORDER — OXYCODONE HYDROCHLORIDE 5 MG/1
10 TABLET ORAL ONCE
Refills: 0 | Status: DISCONTINUED | OUTPATIENT
Start: 2023-11-30 | End: 2023-11-30

## 2023-11-30 RX ORDER — SODIUM CHLORIDE 9 MG/ML
1000 INJECTION, SOLUTION INTRAVENOUS
Refills: 0 | Status: DISCONTINUED | OUTPATIENT
Start: 2023-11-30 | End: 2023-12-01

## 2023-11-30 RX ORDER — ACETAMINOPHEN 500 MG
2 TABLET ORAL
Qty: 0 | Refills: 0 | DISCHARGE
Start: 2023-11-30

## 2023-11-30 RX ORDER — LEVOTHYROXINE SODIUM 125 MCG
1 TABLET ORAL
Refills: 0 | DISCHARGE

## 2023-11-30 RX ORDER — OFLOXACIN 0.3 %
1 DROPS OPHTHALMIC (EYE)
Refills: 0 | Status: COMPLETED | OUTPATIENT
Start: 2023-11-30 | End: 2023-12-01

## 2023-11-30 RX ORDER — SIMETHICONE 80 MG/1
80 TABLET, CHEWABLE ORAL EVERY 6 HOURS
Refills: 0 | Status: DISCONTINUED | OUTPATIENT
Start: 2023-11-30 | End: 2023-12-02

## 2023-11-30 RX ORDER — PANTOPRAZOLE SODIUM 20 MG/1
20 TABLET, DELAYED RELEASE ORAL DAILY
Refills: 0 | Status: DISCONTINUED | OUTPATIENT
Start: 2023-11-30 | End: 2023-12-02

## 2023-11-30 RX ORDER — METOCLOPRAMIDE HCL 10 MG
10 TABLET ORAL EVERY 8 HOURS
Refills: 0 | Status: DISCONTINUED | OUTPATIENT
Start: 2023-11-30 | End: 2023-12-02

## 2023-11-30 RX ORDER — ONDANSETRON 8 MG/1
8 TABLET, FILM COATED ORAL EVERY 8 HOURS
Refills: 0 | Status: DISCONTINUED | OUTPATIENT
Start: 2023-11-30 | End: 2023-12-02

## 2023-11-30 RX ORDER — SPIRONOLACTONE 25 MG/1
1 TABLET, FILM COATED ORAL
Refills: 0 | DISCHARGE

## 2023-11-30 RX ORDER — FENTANYL CITRATE 50 UG/ML
50 INJECTION INTRAVENOUS
Refills: 0 | Status: DISCONTINUED | OUTPATIENT
Start: 2023-11-30 | End: 2023-11-30

## 2023-11-30 RX ORDER — HYDROMORPHONE HYDROCHLORIDE 2 MG/ML
0.5 INJECTION INTRAMUSCULAR; INTRAVENOUS; SUBCUTANEOUS
Refills: 0 | Status: DISCONTINUED | OUTPATIENT
Start: 2023-11-30 | End: 2023-11-30

## 2023-11-30 RX ORDER — HEPARIN SODIUM 5000 [USP'U]/ML
5000 INJECTION INTRAVENOUS; SUBCUTANEOUS EVERY 12 HOURS
Refills: 0 | Status: DISCONTINUED | OUTPATIENT
Start: 2023-12-01 | End: 2023-12-02

## 2023-11-30 RX ORDER — OXYCODONE HYDROCHLORIDE 5 MG/1
5 TABLET ORAL EVERY 4 HOURS
Refills: 0 | Status: DISCONTINUED | OUTPATIENT
Start: 2023-11-30 | End: 2023-12-02

## 2023-11-30 RX ADMIN — HYDROMORPHONE HYDROCHLORIDE 0.5 MILLIGRAM(S): 2 INJECTION INTRAMUSCULAR; INTRAVENOUS; SUBCUTANEOUS at 14:22

## 2023-11-30 RX ADMIN — SODIUM CHLORIDE 125 MILLILITER(S): 9 INJECTION, SOLUTION INTRAVENOUS at 13:36

## 2023-11-30 RX ADMIN — Medication 15 MILLIGRAM(S): at 17:14

## 2023-11-30 RX ADMIN — Medication 1 DROP(S): at 14:17

## 2023-11-30 RX ADMIN — ATORVASTATIN CALCIUM 20 MILLIGRAM(S): 80 TABLET, FILM COATED ORAL at 21:44

## 2023-11-30 RX ADMIN — Medication 1000 MILLIGRAM(S): at 06:46

## 2023-11-30 RX ADMIN — HYDROMORPHONE HYDROCHLORIDE 0.5 MILLIGRAM(S): 2 INJECTION INTRAMUSCULAR; INTRAVENOUS; SUBCUTANEOUS at 14:37

## 2023-11-30 RX ADMIN — Medication 1 DROP(S): at 23:50

## 2023-11-30 RX ADMIN — SIMETHICONE 80 MILLIGRAM(S): 80 TABLET, CHEWABLE ORAL at 17:13

## 2023-11-30 RX ADMIN — Medication 15 MILLIGRAM(S): at 23:50

## 2023-11-30 RX ADMIN — Medication 1 DROP(S): at 17:14

## 2023-11-30 RX ADMIN — Medication 1000 MILLIGRAM(S): at 17:13

## 2023-11-30 RX ADMIN — PANTOPRAZOLE SODIUM 20 MILLIGRAM(S): 20 TABLET, DELAYED RELEASE ORAL at 14:07

## 2023-11-30 RX ADMIN — CELECOXIB 400 MILLIGRAM(S): 200 CAPSULE ORAL at 06:46

## 2023-11-30 RX ADMIN — Medication 1000 MILLIGRAM(S): at 23:49

## 2023-11-30 RX ADMIN — SIMETHICONE 80 MILLIGRAM(S): 80 TABLET, CHEWABLE ORAL at 23:50

## 2023-11-30 RX ADMIN — Medication 1 DROP(S): at 15:08

## 2023-11-30 RX ADMIN — HEPARIN SODIUM 5000 UNIT(S): 5000 INJECTION INTRAVENOUS; SUBCUTANEOUS at 06:46

## 2023-11-30 NOTE — BRIEF OPERATIVE NOTE - NSICDXBRIEFPOSTOP_GEN_ALL_CORE_FT
POST-OP DIAGNOSIS:  Endometriosis 30-Nov-2023 12:14:10  Mitzi Deng  
POST-OP DIAGNOSIS:  Endometriosis 30-Nov-2023 12:14:10  Mitzi Deng  Peritoneal cyst 30-Nov-2023 14:41:01  Umesh Sierra  Right ovarian cyst 30-Nov-2023 14:41:08  Umesh Sierra

## 2023-11-30 NOTE — DISCHARGE NOTE PROVIDER - HOSPITAL COURSE
Patient underwent an uncomplicated RA TLH, BS, R ovarian cystectomy, L peritoneal inclusion cystectomy, flex sigmoidoscopy for endometriosis and adenomyosis. LORETTA drain placed. Pt had swelling of her R hand d/t infiltrated IV. She also has a corneal abrasion. Patient’s postoperative course was unremarkable and she remained hemodynamically stable and afebrile throughout. Upon discharge on POD#_, the patient is ambulating and voiding spontaneously, tolerating oral intake, pain was well controlled with oral medication, and vital signs were stable.    Vital Signs Last 24 Hrs  T(C): 37.1 (30 Nov 2023 16:33), Max: 37.1 (30 Nov 2023 16:33)  T(F): 98.7 (30 Nov 2023 16:33), Max: 98.7 (30 Nov 2023 16:33)  HR: 98 (30 Nov 2023 16:33) (76 - 98)  BP: 115/73 (30 Nov 2023 16:33) (104/58 - 148/63)  BP(mean): 84 (30 Nov 2023 16:00) (76 - 91)  RR: 16 (30 Nov 2023 16:33) (16 - 23)  SpO2: 94% (30 Nov 2023 16:33) (92% - 98%)    Parameters below as of 30 Nov 2023 16:33  Patient On (Oxygen Delivery Method): room air       Patient underwent an uncomplicated RA TLH, BS, R ovarian cystectomy, L peritoneal inclusion cystectomy, flex sigmoidoscopy for endometriosis and adenomyosis. LORETTA drain placed. Pt had swelling of her R hand d/t infiltrated IV. She also had a corneal abrasion that resolved with Oxaflocin on POD1.. Patient’s postoperative course was unremarkable and she remained hemodynamically stable and afebrile throughout. Upon discharge on POD#2, the patient is ambulating and voiding spontaneously, tolerating oral intake, pain was well controlled with oral medication, and vital signs were stable.    Vital Signs Last 24 Hrs  T(C): 37.1 (30 Nov 2023 16:33), Max: 37.1 (30 Nov 2023 16:33)  T(F): 98.7 (30 Nov 2023 16:33), Max: 98.7 (30 Nov 2023 16:33)  HR: 98 (30 Nov 2023 16:33) (76 - 98)  BP: 115/73 (30 Nov 2023 16:33) (104/58 - 148/63)  BP(mean): 84 (30 Nov 2023 16:00) (76 - 91)  RR: 16 (30 Nov 2023 16:33) (16 - 23)  SpO2: 94% (30 Nov 2023 16:33) (92% - 98%)    Parameters below as of 30 Nov 2023 16:33  Patient On (Oxygen Delivery Method): room air

## 2023-11-30 NOTE — DISCHARGE NOTE PROVIDER - CARE PROVIDER_API CALL
Tristen Soares  Obstetrics and Gynecology  [address] [place] [address], [address] [address]  Phone: [phone]  Fax: [phone]  Follow Up Time:

## 2023-11-30 NOTE — CHART NOTE - NSCHARTNOTEFT_GEN_A_CORE
Informed by Dr. Salgado and RN patient c/o right eye discomfort that started in PACU.  Denies use of contact lens / denies previous eye surgeries.  Right eye with moderate epiphora, scant scleral erythema, mild conjunctival edema. Eyelid opened and flushed with sterile NS without change in symptoms. No foreign bodies/inverted lashes appreciated.  Tetracaine ophthalmic drop administered to right eye with relief of symptoms. Discussed with patient that this is diagnostic and she likely has a right corneal abrasion.   Informed of treatment plan, notified to inform providers if no improvement by tomorrow as she will require ophthalmology referral - verbalized understanding.  Plan  No further tetracaine as this can impede healing/cause irritation  Ofloxacin ophthalmic drop to right eye 4x/day for one day  If no change by tomorrow or worsening symptoms, will require ophthalmology consultation  GYN team member Christin informed of above and plan

## 2023-11-30 NOTE — H&P ADULT - ASSESSMENT
49yo P2 presents for scheduled RA TLH, BS for chronic pelvic pain, adenomyosis, endometriosis.  - consents signed  - will proceed w/ scheduled case  - d/w Dr. Soares

## 2023-11-30 NOTE — PROGRESS NOTE ADULT - ASSESSMENT
49yo s/p scheduled ANDRÉS ROGERS, BS, gens surg (Sudha) performed significant CYN, excision of L peritoneal 14cm simple cyst, R ovarian cystectomy, flex sig w/ air leak test, LORETTA drain placement for endometriosis and adenomyosis, chronic pelvic pain    Neuro: tylenol/toradol ATC, oxy prn  CV: VSS, Atorvastatin 20mg qd   Pulm: RA  GI: Sips and chips, /hr, zofran/reglan PRN, simethicone ATC, diet per gen surg -/-  GYN: s/p ANDRÉS ROGERS, CHERRY  : carvajal  Heme: 13.9>  Endo: Levothyroxine 125mcg qd  VTE ppx: SCDs, SQH to start 12/1 AM  lines/drains: R abd LORETTA drain (11/30-)    [ ] AM cbc, bmp, mg, phos ordered  [ ] POC 17;00  [ ] carvajal out and heplock in AM  [ ] SQH bid starting 12/1 morning   [ ] keep R arm elevated w/ warm compresses for IV infiltration intra-op  [ ]Restart Spiroonolactone 25mg if needed

## 2023-11-30 NOTE — BRIEF OPERATIVE NOTE - NSICDXBRIEFPREOP_GEN_ALL_CORE_FT
Attempted to reach patient's daughter to complete a hospital follow up call.  No answer, voicemail full.  
PRE-OP DIAGNOSIS:  Peritoneal cyst 30-Nov-2023 12:13:58  Mitzi Deng  Endometriosis 30-Nov-2023 12:14:04  Mitzi Deng  
PRE-OP DIAGNOSIS:  Peritoneal cyst 30-Nov-2023 12:13:58  Mitzi Deng  Endometriosis 30-Nov-2023 12:14:04  Mitzi Deng

## 2023-11-30 NOTE — BRIEF OPERATIVE NOTE - OPERATION/FINDINGS
- Co-managed case performed by both OBGYN and team 4 general surgery.  - Robotic approach taken with Kota cutdown for initial camera port followed by 2 robotic ports.  - Extensive lysis of adhesions performed to remove uterine attachments to colon and peritoneum.  - After uterine lysed from colonic attachments, protctoscopy performed to ensure no leak or perforation.  - Two large cysts identified, 18cm peritoneal cyst as well as smaller approximately 8cm cyst on contralateral side. Cysts ruptured and abdomen irrigated, contents of cyst appeared to be simple in nature, not purulent, bloody, or reflective of endometrioma  - OBGYN performed their portion of the procedure  - Prior to closing, flexible sigmoidoscopy performed to confirm no leakage or perforation, air bubble test negative.  - 19F [place] drain placed in the pelvis  - Incisions closed in layers  - No complications encountered, hemostasis achieved.
s/p RA TLMAYANK, BS. Surgery (Sudha) performed significant CYN, excision of L peritoneal 14cm simple cyst, R ovarian cystectomy, flex sig w/ air leak test, LORETTA drain placement for endometriosis and adenomyosis, chronic pelvic pain. Dense adhesions between peritoneal cyst and sigmoid colon and posterior uterus. Also adhesions between peritoneal cyst and b/l adnexa. Peritoneal cyst w/ serous appearing fluid. Uterus w/ some bogginess and slightly enlarged to about 10w. R fallopian tube dilated. Flex sig w/ air leak test w/o evidence of sigmoid defect. Cysto w/ b/l urine jets noted from b/l ureteral orifices.

## 2023-11-30 NOTE — DISCHARGE NOTE PROVIDER - NSDCCPCAREPLAN_GEN_ALL_CORE_FT
PRINCIPAL DISCHARGE DIAGNOSIS  Diagnosis: Endometriosis  Assessment and Plan of Treatment:       SECONDARY DISCHARGE DIAGNOSES  Diagnosis: Uterus, adenomyosis  Assessment and Plan of Treatment:

## 2023-11-30 NOTE — PROGRESS NOTE ADULT - SUBJECTIVE AND OBJECTIVE BOX
GYN POC    Pt seen and examined at bedside. Pt complains of mild abdominal pain.   Pt denies any fever, chills, chest pain, SOB, nausea or vomiting. Tolerating ice chips.     T(F): 98.7 (11-30-23 @ 16:33), Max: 98.7 (11-30-23 @ 16:33)  HR: 98 (11-30-23 @ 16:33) (76 - 98)  BP: 115/73 (11-30-23 @ 16:33) (104/58 - 148/63)  RR: 16 (11-30-23 @ 16:33) (16 - 23)  SpO2: 94% (11-30-23 @ 16:33) (92% - 98%)  Wt(kg): --    11-30 @ 07:01  -  11-30 @ 17:35  --------------------------------------------------------  IN: 500 mL / OUT: 1005 mL / NET: -505 mL        acetaminophen     Tablet .. 1000 milliGRAM(s) Oral every 6 hours  atorvastatin 20 milliGRAM(s) Oral at bedtime  HYDROmorphone  Injectable 0.5 milliGRAM(s) IV Push every 15 minutes PRN breakthrough pain in the PACU  ketorolac   Injectable 15 milliGRAM(s) IV Push every 6 hours  lactated ringers. 1000 milliLiter(s) IV Continuous <Continuous>  levothyroxine 125 MICROGram(s) Oral daily  metoclopramide Injectable 10 milliGRAM(s) IV Push every 8 hours PRN Nausea and/or Vomiting  ofloxacin 0.3% Solution 1 Drop(s) Right EYE four times a day  ondansetron Injectable 8 milliGRAM(s) IV Push every 8 hours PRN Nausea and/or Vomiting  oxyCODONE    IR 10 milliGRAM(s) Oral every 4 hours PRN Severe Pain (7 - 10)  oxyCODONE    IR 5 milliGRAM(s) Oral every 4 hours PRN Moderate Pain (4 - 6)  pantoprazole  Injectable 20 milliGRAM(s) IV Push daily  simethicone 80 milliGRAM(s) Chew every 6 hours      Physical exam:  Constitutional: NAD  Pulmonary: no increased WOB  Abdomen: incision site clean, dry and intact. LORETTA sanguinous. Soft, mildly tender, nondistended  Extremities: no lower extremity edema, or calf tenderness. SCDs in place   GYN POC    Pt seen and examined at bedside. Pt complains of mild abdominal pain.   Pt denies any fever, chills, chest pain, SOB, nausea or vomiting. Tolerating ice chips.     T(F): 98.7 (11-30-23 @ 16:33), Max: 98.7 (11-30-23 @ 16:33)  HR: 98 (11-30-23 @ 16:33) (76 - 98)  BP: 115/73 (11-30-23 @ 16:33) (104/58 - 148/63)  RR: 16 (11-30-23 @ 16:33) (16 - 23)  SpO2: 94% (11-30-23 @ 16:33) (92% - 98%)  Wt(kg): --    11-30 @ 07:01  -  11-30 @ 17:35  --------------------------------------------------------  IN: 500 mL / OUT: 1005 mL / NET: -505 mL        acetaminophen     Tablet .. 1000 milliGRAM(s) Oral every 6 hours  atorvastatin 20 milliGRAM(s) Oral at bedtime  HYDROmorphone  Injectable 0.5 milliGRAM(s) IV Push every 15 minutes PRN breakthrough pain in the PACU  ketorolac   Injectable 15 milliGRAM(s) IV Push every 6 hours  lactated ringers. 1000 milliLiter(s) IV Continuous <Continuous>  levothyroxine 125 MICROGram(s) Oral daily  metoclopramide Injectable 10 milliGRAM(s) IV Push every 8 hours PRN Nausea and/or Vomiting  ofloxacin 0.3% Solution 1 Drop(s) Right EYE four times a day  ondansetron Injectable 8 milliGRAM(s) IV Push every 8 hours PRN Nausea and/or Vomiting  oxyCODONE    IR 10 milliGRAM(s) Oral every 4 hours PRN Severe Pain (7 - 10)  oxyCODONE    IR 5 milliGRAM(s) Oral every 4 hours PRN Moderate Pain (4 - 6)  pantoprazole  Injectable 20 milliGRAM(s) IV Push daily  simethicone 80 milliGRAM(s) Chew every 6 hours      Physical exam:  Constitutional: NAD  Pulmonary: no increased WOB  Abdomen: incision site clean, dry and intact. LORETTA sanguinous. Soft, mildly tender, nondistended  Extremities: no lower extremity edema, or calf tenderness. RUE significantly swollen. SCDs in place

## 2023-11-30 NOTE — H&P ADULT - HISTORY OF PRESENT ILLNESS
47yo P2 presents for scheduled RA TLH, BS for chronic pelvic pain, adenomyosis, endometriosis.    Obhx:    Gynhx: endometriosis, adenomyosis  PMH: hashimoto's, HLD  PSH: abd myomectomy x2, l/s ovarian cystectomy  meds; synthroid 125mcg qd, spironolactone 25mg qd, atorvastatin 20mg qd  all: nkda  socail: denies

## 2023-11-30 NOTE — BRIEF OPERATIVE NOTE - NSICDXBRIEFPROCEDURE_GEN_ALL_CORE_FT
PROCEDURES:  Total vaginal hysterectomy with cystoscopy 30-Nov-2023 12:13:08  Mitzi Deng  
PROCEDURES:  Robot-assisted laparoscopic total hysterectomy with bilateral salpingectomy and cystoscopy using da Esperanza Xi 30-Nov-2023 14:40:24  Umesh Sierra  Lysis of pelvic adhesions 30-Nov-2023 14:40:39  Umesh Sierra

## 2023-11-30 NOTE — CHART NOTE - NSCHARTNOTEFT_GEN_A_CORE
***** INCOMPLETE 11/30 ******    SUBJECTIVE: Patient seen and examined bedside by chief resident. JI since procedure. Endorses good pain control with medication. Denies nausea/vomiting tolerating ice chips and sips in PACU. Denies flatus and bowel movements. No F/C.     I&O's Detail    30 Nov 2023 07:01  -  30 Nov 2023 16:11  --------------------------------------------------------  IN:    Lactated Ringers: 500 mL  Total IN: 500 mL    OUT:    Bulb (mL): 250 mL    Indwelling Catheter - Urethral (mL): 605 mL  Total OUT: 855 mL    Total NET: -355 mL          Vital Signs Last 24 Hrs  T(C): 37 (30 Nov 2023 15:00), Max: 37 (30 Nov 2023 15:00)  T(F): 98.6 (30 Nov 2023 15:00), Max: 98.6 (30 Nov 2023 15:00)  HR: 90 (30 Nov 2023 16:00) (76 - 98)  BP: 115/64 (30 Nov 2023 16:00) (104/58 - 148/63)  BP(mean): 84 (30 Nov 2023 16:00) (76 - 91)  RR: 21 (30 Nov 2023 16:00) (16 - 23)  SpO2: 96% (30 Nov 2023 16:00) (92% - 98%)    Parameters below as of 30 Nov 2023 16:00  Patient On (Oxygen Delivery Method): room air    General: NAD, resting comfortably in bed  C/V: NSR  Pulm: Nonlabored breathing, no respiratory distress  Abd: soft, diffusely NT/ ND, midline low abdominal incision c/d/i. Mild incisional tenderness   Extrem: WWP, no edema, SCDs in place    49yo s/p scheduled RA TLH, BS, gens surg (Sudha) performed significant CYN, excision of L peritoneal 14cm simple cyst, R ovarian cystectomy, flex sig w/ air leak test, LORETTA drain placement for endometriosis and adenomyosis, chronic pelvic pain. Dense adhesions in posterior cul-de-sac between the 14cm peritoneal cyst, sigmoid, posterior uterus, and b/l adnexa.     Patient's postoperative check was within normal limits. Continue excellent care per primary team  - NPO with ice chips and sips until re-evaluation tomorrow morning  - OOB as tolerated/ IS/ SCDs  - Rest of care per primary SUBJECTIVE: Patient seen and examined bedside by chief resident. JI since procedure. Endorses good pain control with medication. Denies nausea/vomiting tolerating ice chips and sips in PACU. Denies flatus and bowel movements. No F/C. Patient is complaining of some right sided eye irritation since the procedure.     I&O's Detail    30 Nov 2023 07:01  -  30 Nov 2023 16:11  --------------------------------------------------------  IN:    Lactated Ringers: 500 mL  Total IN: 500 mL    OUT:    Bulb (mL): 250 mL    Indwelling Catheter - Urethral (mL): 605 mL  Total OUT: 855 mL    Total NET: -355 mL          Vital Signs Last 24 Hrs  T(C): 37 (30 Nov 2023 15:00), Max: 37 (30 Nov 2023 15:00)  T(F): 98.6 (30 Nov 2023 15:00), Max: 98.6 (30 Nov 2023 15:00)  HR: 90 (30 Nov 2023 16:00) (76 - 98)  BP: 115/64 (30 Nov 2023 16:00) (104/58 - 148/63)  BP(mean): 84 (30 Nov 2023 16:00) (76 - 91)  RR: 21 (30 Nov 2023 16:00) (16 - 23)  SpO2: 96% (30 Nov 2023 16:00) (92% - 98%)    Parameters below as of 30 Nov 2023 16:00  Patient On (Oxygen Delivery Method): room air    General: NAD, resting comfortably in bed  C/V: NSR  Pulm: Nonlabored breathing, no respiratory distress  Abd: soft, diffusely NT/ ND, 3 incisions c/d/i Mild incisional tenderness. LORETTA drain in place holding suction with SS output  Extrem: WWP, no edema, SCDs in place    49yo s/p scheduled RA TLH, BS, gens surg (Sudha) performed significant CYN, excision of L peritoneal 14cm simple cyst, R ovarian cystectomy, flex sig w/ air leak test, LORETTA drain placement for endometriosis and adenomyosis, chronic pelvic pain. Dense adhesions in posterior cul-de-sac between the 14cm peritoneal cyst, sigmoid, posterior uterus, and b/l adnexa.     Patient's postoperative check was within normal limits. Continue excellent care per primary team  - NPO with ice chips and sips until re-evaluation tomorrow morning  - OOB as tolerated/ IS/ SCDs  - Hold off evening dose of SQH given high output from drain  - Rest of care per primary

## 2023-11-30 NOTE — DISCHARGE NOTE PROVIDER - NSDCCPTREATMENT_GEN_ALL_CORE_FT
PRINCIPAL PROCEDURE  Procedure: Robot-assisted laparoscopic total hysterectomy with bilateral salpingectomy and cystoscopy using da Esperanza Xi  Findings and Treatment:       SECONDARY PROCEDURE  Procedure: Lysis of pelvic adhesions  Findings and Treatment:     Procedure: Cystectomy, ovarian, laparoscopic  Findings and Treatment: robot assisted

## 2023-11-30 NOTE — DISCHARGE NOTE PROVIDER - NSDCMRMEDTOKEN_GEN_ALL_CORE_FT
acetaminophen 500 mg oral tablet: 2 tab(s) orally every 6 hours  atorvastatin 20 mg oral tablet: 1 tab(s) orally once a day  spironolactone 25 mg oral tablet: 1 tab(s) orally once a day  Synthroid 125 mcg (0.125 mg) oral tablet: 1 tab(s) orally once a day

## 2023-12-01 LAB
ANION GAP SERPL CALC-SCNC: 9 MMOL/L — SIGNIFICANT CHANGE UP (ref 5–17)
ANION GAP SERPL CALC-SCNC: 9 MMOL/L — SIGNIFICANT CHANGE UP (ref 5–17)
BASOPHILS # BLD AUTO: 0.07 K/UL — SIGNIFICANT CHANGE UP (ref 0–0.2)
BASOPHILS # BLD AUTO: 0.07 K/UL — SIGNIFICANT CHANGE UP (ref 0–0.2)
BASOPHILS NFR BLD AUTO: 1 % — SIGNIFICANT CHANGE UP (ref 0–2)
BASOPHILS NFR BLD AUTO: 1 % — SIGNIFICANT CHANGE UP (ref 0–2)
BUN SERPL-MCNC: 10 MG/DL — SIGNIFICANT CHANGE UP (ref 7–23)
BUN SERPL-MCNC: 10 MG/DL — SIGNIFICANT CHANGE UP (ref 7–23)
CALCIUM SERPL-MCNC: 8.9 MG/DL — SIGNIFICANT CHANGE UP (ref 8.4–10.5)
CALCIUM SERPL-MCNC: 8.9 MG/DL — SIGNIFICANT CHANGE UP (ref 8.4–10.5)
CHLORIDE SERPL-SCNC: 107 MMOL/L — SIGNIFICANT CHANGE UP (ref 96–108)
CHLORIDE SERPL-SCNC: 107 MMOL/L — SIGNIFICANT CHANGE UP (ref 96–108)
CO2 SERPL-SCNC: 26 MMOL/L — SIGNIFICANT CHANGE UP (ref 22–31)
CO2 SERPL-SCNC: 26 MMOL/L — SIGNIFICANT CHANGE UP (ref 22–31)
CREAT SERPL-MCNC: 0.68 MG/DL — SIGNIFICANT CHANGE UP (ref 0.5–1.3)
CREAT SERPL-MCNC: 0.68 MG/DL — SIGNIFICANT CHANGE UP (ref 0.5–1.3)
EGFR: 107 ML/MIN/1.73M2 — SIGNIFICANT CHANGE UP
EGFR: 107 ML/MIN/1.73M2 — SIGNIFICANT CHANGE UP
EOSINOPHIL # BLD AUTO: 0.23 K/UL — SIGNIFICANT CHANGE UP (ref 0–0.5)
EOSINOPHIL # BLD AUTO: 0.23 K/UL — SIGNIFICANT CHANGE UP (ref 0–0.5)
EOSINOPHIL NFR BLD AUTO: 3.3 % — SIGNIFICANT CHANGE UP (ref 0–6)
EOSINOPHIL NFR BLD AUTO: 3.3 % — SIGNIFICANT CHANGE UP (ref 0–6)
GLUCOSE SERPL-MCNC: 91 MG/DL — SIGNIFICANT CHANGE UP (ref 70–99)
GLUCOSE SERPL-MCNC: 91 MG/DL — SIGNIFICANT CHANGE UP (ref 70–99)
HCT VFR BLD CALC: 34.7 % — SIGNIFICANT CHANGE UP (ref 34.5–45)
HCT VFR BLD CALC: 34.7 % — SIGNIFICANT CHANGE UP (ref 34.5–45)
HGB BLD-MCNC: 11.4 G/DL — LOW (ref 11.5–15.5)
HGB BLD-MCNC: 11.4 G/DL — LOW (ref 11.5–15.5)
IMM GRANULOCYTES NFR BLD AUTO: 0.3 % — SIGNIFICANT CHANGE UP (ref 0–0.9)
IMM GRANULOCYTES NFR BLD AUTO: 0.3 % — SIGNIFICANT CHANGE UP (ref 0–0.9)
LYMPHOCYTES # BLD AUTO: 1.93 K/UL — SIGNIFICANT CHANGE UP (ref 1–3.3)
LYMPHOCYTES # BLD AUTO: 1.93 K/UL — SIGNIFICANT CHANGE UP (ref 1–3.3)
LYMPHOCYTES # BLD AUTO: 27.6 % — SIGNIFICANT CHANGE UP (ref 13–44)
LYMPHOCYTES # BLD AUTO: 27.6 % — SIGNIFICANT CHANGE UP (ref 13–44)
MAGNESIUM SERPL-MCNC: 2 MG/DL — SIGNIFICANT CHANGE UP (ref 1.6–2.6)
MAGNESIUM SERPL-MCNC: 2 MG/DL — SIGNIFICANT CHANGE UP (ref 1.6–2.6)
MCHC RBC-ENTMCNC: 30.1 PG — SIGNIFICANT CHANGE UP (ref 27–34)
MCHC RBC-ENTMCNC: 30.1 PG — SIGNIFICANT CHANGE UP (ref 27–34)
MCHC RBC-ENTMCNC: 32.9 GM/DL — SIGNIFICANT CHANGE UP (ref 32–36)
MCHC RBC-ENTMCNC: 32.9 GM/DL — SIGNIFICANT CHANGE UP (ref 32–36)
MCV RBC AUTO: 91.6 FL — SIGNIFICANT CHANGE UP (ref 80–100)
MCV RBC AUTO: 91.6 FL — SIGNIFICANT CHANGE UP (ref 80–100)
MONOCYTES # BLD AUTO: 0.65 K/UL — SIGNIFICANT CHANGE UP (ref 0–0.9)
MONOCYTES # BLD AUTO: 0.65 K/UL — SIGNIFICANT CHANGE UP (ref 0–0.9)
MONOCYTES NFR BLD AUTO: 9.3 % — SIGNIFICANT CHANGE UP (ref 2–14)
MONOCYTES NFR BLD AUTO: 9.3 % — SIGNIFICANT CHANGE UP (ref 2–14)
NEUTROPHILS # BLD AUTO: 4.09 K/UL — SIGNIFICANT CHANGE UP (ref 1.8–7.4)
NEUTROPHILS # BLD AUTO: 4.09 K/UL — SIGNIFICANT CHANGE UP (ref 1.8–7.4)
NEUTROPHILS NFR BLD AUTO: 58.5 % — SIGNIFICANT CHANGE UP (ref 43–77)
NEUTROPHILS NFR BLD AUTO: 58.5 % — SIGNIFICANT CHANGE UP (ref 43–77)
NRBC # BLD: 0 /100 WBCS — SIGNIFICANT CHANGE UP (ref 0–0)
NRBC # BLD: 0 /100 WBCS — SIGNIFICANT CHANGE UP (ref 0–0)
PHOSPHATE SERPL-MCNC: 3.6 MG/DL — SIGNIFICANT CHANGE UP (ref 2.5–4.5)
PHOSPHATE SERPL-MCNC: 3.6 MG/DL — SIGNIFICANT CHANGE UP (ref 2.5–4.5)
PLATELET # BLD AUTO: 194 K/UL — SIGNIFICANT CHANGE UP (ref 150–400)
PLATELET # BLD AUTO: 194 K/UL — SIGNIFICANT CHANGE UP (ref 150–400)
POTASSIUM SERPL-MCNC: 3.6 MMOL/L — SIGNIFICANT CHANGE UP (ref 3.5–5.3)
POTASSIUM SERPL-MCNC: 3.6 MMOL/L — SIGNIFICANT CHANGE UP (ref 3.5–5.3)
POTASSIUM SERPL-SCNC: 3.6 MMOL/L — SIGNIFICANT CHANGE UP (ref 3.5–5.3)
POTASSIUM SERPL-SCNC: 3.6 MMOL/L — SIGNIFICANT CHANGE UP (ref 3.5–5.3)
RBC # BLD: 3.79 M/UL — LOW (ref 3.8–5.2)
RBC # BLD: 3.79 M/UL — LOW (ref 3.8–5.2)
RBC # FLD: 12.1 % — SIGNIFICANT CHANGE UP (ref 10.3–14.5)
RBC # FLD: 12.1 % — SIGNIFICANT CHANGE UP (ref 10.3–14.5)
SODIUM SERPL-SCNC: 142 MMOL/L — SIGNIFICANT CHANGE UP (ref 135–145)
SODIUM SERPL-SCNC: 142 MMOL/L — SIGNIFICANT CHANGE UP (ref 135–145)
WBC # BLD: 6.99 K/UL — SIGNIFICANT CHANGE UP (ref 3.8–10.5)
WBC # BLD: 6.99 K/UL — SIGNIFICANT CHANGE UP (ref 3.8–10.5)
WBC # FLD AUTO: 6.99 K/UL — SIGNIFICANT CHANGE UP (ref 3.8–10.5)
WBC # FLD AUTO: 6.99 K/UL — SIGNIFICANT CHANGE UP (ref 3.8–10.5)

## 2023-12-01 RX ORDER — IBUPROFEN 200 MG
600 TABLET ORAL EVERY 6 HOURS
Refills: 0 | Status: DISCONTINUED | OUTPATIENT
Start: 2023-12-01 | End: 2023-12-02

## 2023-12-01 RX ORDER — POTASSIUM CHLORIDE 20 MEQ
40 PACKET (EA) ORAL ONCE
Refills: 0 | Status: COMPLETED | OUTPATIENT
Start: 2023-12-01 | End: 2023-12-01

## 2023-12-01 RX ADMIN — Medication 1000 MILLIGRAM(S): at 23:18

## 2023-12-01 RX ADMIN — HEPARIN SODIUM 5000 UNIT(S): 5000 INJECTION INTRAVENOUS; SUBCUTANEOUS at 18:11

## 2023-12-01 RX ADMIN — Medication 1000 MILLIGRAM(S): at 18:11

## 2023-12-01 RX ADMIN — PANTOPRAZOLE SODIUM 20 MILLIGRAM(S): 20 TABLET, DELAYED RELEASE ORAL at 12:31

## 2023-12-01 RX ADMIN — Medication 15 MILLIGRAM(S): at 12:31

## 2023-12-01 RX ADMIN — Medication 1000 MILLIGRAM(S): at 06:12

## 2023-12-01 RX ADMIN — Medication 1 DROP(S): at 06:14

## 2023-12-01 RX ADMIN — Medication 40 MILLIEQUIVALENT(S): at 12:31

## 2023-12-01 RX ADMIN — SIMETHICONE 80 MILLIGRAM(S): 80 TABLET, CHEWABLE ORAL at 06:13

## 2023-12-01 RX ADMIN — SIMETHICONE 80 MILLIGRAM(S): 80 TABLET, CHEWABLE ORAL at 23:19

## 2023-12-01 RX ADMIN — SIMETHICONE 80 MILLIGRAM(S): 80 TABLET, CHEWABLE ORAL at 12:31

## 2023-12-01 RX ADMIN — Medication 1 DROP(S): at 12:31

## 2023-12-01 RX ADMIN — SIMETHICONE 80 MILLIGRAM(S): 80 TABLET, CHEWABLE ORAL at 18:11

## 2023-12-01 RX ADMIN — Medication 600 MILLIGRAM(S): at 23:19

## 2023-12-01 RX ADMIN — HEPARIN SODIUM 5000 UNIT(S): 5000 INJECTION INTRAVENOUS; SUBCUTANEOUS at 06:13

## 2023-12-01 RX ADMIN — Medication 15 MILLIGRAM(S): at 06:13

## 2023-12-01 RX ADMIN — Medication 125 MICROGRAM(S): at 06:13

## 2023-12-01 RX ADMIN — Medication 1000 MILLIGRAM(S): at 12:30

## 2023-12-01 RX ADMIN — ATORVASTATIN CALCIUM 20 MILLIGRAM(S): 80 TABLET, FILM COATED ORAL at 22:04

## 2023-12-01 NOTE — PROGRESS NOTE ADULT - SUBJECTIVE AND OBJECTIVE BOX
SUBJECTIVE: Patient seen and examined bedside by chief resident. [place]. Endorses good pain control with medication. Denies nausea/vomiting tolerating ice chips and sips. Endorses flatus but no bowel movements. No F/C. Continues to complain of eye pain and hand pain.     heparin   Injectable 5000 Unit(s) SubCutaneous every 12 hours    MEDICATIONS  (PRN):  HYDROmorphone  Injectable 0.5 milliGRAM(s) IV Push every 15 minutes PRN breakthrough pain in the PACU  metoclopramide Injectable 10 milliGRAM(s) IV Push every 8 hours PRN Nausea and/or Vomiting  ondansetron Injectable 8 milliGRAM(s) IV Push every 8 hours PRN Nausea and/or Vomiting  oxyCODONE    IR 5 milliGRAM(s) Oral every 4 hours PRN Moderate Pain (4 - 6)  oxyCODONE    IR 10 milliGRAM(s) Oral every 4 hours PRN Severe Pain (7 - 10)      I&O's Detail    30 Nov 2023 07:01  -  01 Dec 2023 07:00  --------------------------------------------------------  IN:    Lactated Ringers: 1875 mL  Total IN: 1875 mL    OUT:    Bulb (mL): 380 mL    Indwelling Catheter - Urethral (mL): 2005 mL  Total OUT: 2385 mL    Total NET: -510 mL          Vital Signs Last 24 Hrs  T(C): 37.1 (01 Dec 2023 04:44), Max: 37.1 (30 Nov 2023 16:33)  T(F): 98.8 (01 Dec 2023 04:44), Max: 98.8 (01 Dec 2023 04:44)  HR: 64 (01 Dec 2023 04:44) (64 - 98)  BP: 115/67 (01 Dec 2023 04:44) (104/58 - 148/63)  BP(mean): 84 (30 Nov 2023 16:00) (76 - 91)  RR: 18 (01 Dec 2023 04:44) (16 - 23)  SpO2: 95% (01 Dec 2023 04:44) (92% - 99%)    Parameters below as of 01 Dec 2023 04:44  Patient On (Oxygen Delivery Method): room air        General: NAD, resting comfortably in bed  C/V: NSR  Pulm: Nonlabored breathing, no respiratory distress  Abd: soft, NT/ND, LORETTA (SS with clots), incisions c/d/i  Extrem: WWP, no edema, SCDs in place    LABS:                        11.4   6.99  )-----------( 194      ( 01 Dec 2023 06:57 )             34.7                 RADIOLOGY & ADDITIONAL STUDIES:

## 2023-12-01 NOTE — CHART NOTE - NSCHARTNOTEFT_GEN_A_CORE
Pt seen and examined without complaints. Pain is somewhat controlled. Denies SOB/CP/N/V. Denies passing any flatus/BM, tolerating ice chips at this time.    Vital Signs Last 24 Hrs  T(C): 37.1 (01 Dec 2023 04:44), Max: 37.1 (30 Nov 2023 16:33)  T(F): 98.8 (01 Dec 2023 04:44), Max: 98.8 (01 Dec 2023 04:44)  HR: 64 (01 Dec 2023 04:44) (64 - 98)  BP: 115/67 (01 Dec 2023 04:44) (104/58 - 148/63)  BP(mean): 84 (30 Nov 2023 16:00) (76 - 91)  RR: 18 (01 Dec 2023 04:44) (16 - 23)  SpO2: 95% (01 Dec 2023 04:44) (92% - 99%)    Parameters below as of 01 Dec 2023 04:44  Patient On (Oxygen Delivery Method): room air      I&O's Summary    30 Nov 2023 07:01  -  01 Dec 2023 05:53  --------------------------------------------------------  IN: 1875 mL / OUT: 2385 mL / NET: -510 mL      Physical Exam  Gen: NAD, A&Ox3  Pulm: No respiratory distress, no subcostal retractions  CV: RRR, no JVD  Abd: Soft, incisional TTP, ND, no rebound or guarding, cdi, LORETTA w/ SS ouput      47yo s/p scheduled RA TLH, BS, gens surg (Sudha) performed significant CYN, excision of L peritoneal 14cm simple cyst, R ovarian cystectomy, flex sig w/ air leak test, LORETTA drain placement for endometriosis and adenomyosis, chronic pelvic pain. Dense adhesions in posterior cul-de-sac between the 14cm peritoneal cyst, sigmoid, posterior uterus, and b/l adnexa.       - OOB as tolerated/ IS/ SCDs  - Rest of care per primary.

## 2023-12-01 NOTE — PROGRESS NOTE ADULT - ASSESSMENT
47yo s/p scheduled RA TLH, BS, gens surg (Sudha) performed significant CYN, excision of L peritoneal 14cm simple cyst, R ovarian cystectomy, flex sig w/ air leak test, LORETTA drain placement for endometriosis and adenomyosis, chronic pelvic pain. Dense adhesions in posterior cul-de-sac between the 14cm peritoneal cyst, sigmoid, posterior uterus, and b/l adnexa.     - CLD, await further ROBF  - OOB as tolerated/ IS/ SCDs  - Okay to restart SQH today  - Rest of care per primary.

## 2023-12-01 NOTE — CHART NOTE - NSCHARTNOTESELECT_GEN_ALL_CORE
Anesthesia Post Evaluation    Patient: Cayden Reis    Procedure(s) Performed: Procedure(s) (LRB):  EXTRACTION, CATARACT, WITH IOL INSERTION (Left)    Final Anesthesia Type: MAC    Patient location during evaluation: PACU  Patient participation: Yes- Able to Participate  Level of consciousness: awake and alert  Post-procedure vital signs: reviewed and stable  Pain management: adequate  Airway patency: patent    PONV status at discharge: No PONV  Anesthetic complications: no      Cardiovascular status: blood pressure returned to baseline  Respiratory status: unassisted  Hydration status: euvolemic  Follow-up not needed.          Vitals Value Taken Time   /68 2/10/2020  9:52 AM   Temp 36.3 °C (97.4 °F) 2/10/2020  9:42 AM   Pulse 68 2/10/2020  9:52 AM   Resp 18 2/10/2020  9:52 AM   SpO2 94 % 2/10/2020  9:52 AM         Event Time     Out of Recovery 10:05:00          Pain/Eliazar Score: Eliazar Score: 10 (2/10/2020  9:42 AM)         Event Note

## 2023-12-01 NOTE — PROGRESS NOTE ADULT - SUBJECTIVE AND OBJECTIVE BOX
Awake alert and oriented  Ambulating  No c/o pain  Afebrile, VSS  Lungs clear  Abdomen soft, wounds dry  LORETTA drainage 40 today  Ext No C/C/E    Plan  Full fluid diet  OOB  May D/C LORETTA in AM  May D/C home tomorrow

## 2023-12-01 NOTE — PROGRESS NOTE ADULT - ATTENDING COMMENTS
POD 1, Abdomen soft, appropriately tender, BS+, Flatus+, BM-, wounds dry, clean, intact, LORETTA serous , tolerating clear, liquid diet, advance to full liquid diet, DVT prophylaxis, Spirometry, OOB to ambulate, F/U LABS, VS

## 2023-12-01 NOTE — PROGRESS NOTE ADULT - SUBJECTIVE AND OBJECTIVE BOX
GYN Progress Note    Patient seen at bedside.   Pain controlled overnight.   Tolerating ice chips.   Not oob yet.   carvajal in place.   +flatus   eye pain from corneal abrasion improved w/ eye drops.     Denies CP, palpitations, SOB, fever, chills, nausea, vomiting.    Vital Signs Last 24 Hrs  T(C): 37.1 (01 Dec 2023 04:44), Max: 37.1 (30 Nov 2023 16:33)  T(F): 98.8 (01 Dec 2023 04:44), Max: 98.8 (01 Dec 2023 04:44)  HR: 64 (01 Dec 2023 04:44) (64 - 98)  BP: 115/67 (01 Dec 2023 04:44) (104/58 - 148/63)  BP(mean): 84 (30 Nov 2023 16:00) (76 - 91)  RR: 18 (01 Dec 2023 04:44) (16 - 23)  SpO2: 95% (01 Dec 2023 04:44) (92% - 99%)    Parameters below as of 01 Dec 2023 04:44  Patient On (Oxygen Delivery Method): room air        Physical Exam:   Gen: No Acute Distress   GI: soft, appropriately tender, minimally distended, normoactive BS, no rebound, no guarding. Serosanguinous output from drain.   Incisions C/D/I   Ext: SCDs in place. RUE swollen from infiltrated iv intraop.     I&O's Summary    30 Nov 2023 07:01  -  01 Dec 2023 07:00  --------------------------------------------------------  IN: 1875 mL / OUT: 3185 mL / NET: -1310 mL      MEDICATIONS  (STANDING):  acetaminophen     Tablet .. 1000 milliGRAM(s) Oral every 6 hours  atorvastatin 20 milliGRAM(s) Oral at bedtime  heparin   Injectable 5000 Unit(s) SubCutaneous every 12 hours  ketorolac   Injectable 15 milliGRAM(s) IV Push every 6 hours  levothyroxine 125 MICROGram(s) Oral daily  ofloxacin 0.3% Solution 1 Drop(s) Right EYE four times a day  pantoprazole  Injectable 20 milliGRAM(s) IV Push daily  simethicone 80 milliGRAM(s) Chew every 6 hours    MEDICATIONS  (PRN):  HYDROmorphone  Injectable 0.5 milliGRAM(s) IV Push every 15 minutes PRN breakthrough pain in the PACU  metoclopramide Injectable 10 milliGRAM(s) IV Push every 8 hours PRN Nausea and/or Vomiting  ondansetron Injectable 8 milliGRAM(s) IV Push every 8 hours PRN Nausea and/or Vomiting  oxyCODONE    IR 5 milliGRAM(s) Oral every 4 hours PRN Moderate Pain (4 - 6)  oxyCODONE    IR 10 milliGRAM(s) Oral every 4 hours PRN Severe Pain (7 - 10)      LABS:                        11.4   6.99  )-----------( 194      ( 01 Dec 2023 06:57 )             34.7     12-01    142  |  107  |  10  ----------------------------<  91  3.6   |  26  |  0.68    Ca    8.9      01 Dec 2023 06:57  Phos  3.6     12-01  Mg     2.0     12-01        Urinalysis Basic - ( 01 Dec 2023 06:57 )    Color: x / Appearance: x / SG: x / pH: x  Gluc: 91 mg/dL / Ketone: x  / Bili: x / Urobili: x   Blood: x / Protein: x / Nitrite: x   Leuk Esterase: x / RBC: x / WBC x   Sq Epi: x / Non Sq Epi: x / Bacteria: x

## 2023-12-01 NOTE — PROGRESS NOTE ADULT - ASSESSMENT
49yo POD1 s/p scheduled CHERRY LONGO, gens surg (Sudha) performed significant CYN, excision of L peritoneal 14cm simple cyst, R ovarian cystectomy, flex sig w/ air leak test, LORETTA drain placement for endometriosis and adenomyosis, chronic pelvic pain    Neuro: tylenol/toradol ATC, oxy prn  CV: VSS, Atorvastatin 20mg qd   Pulm: RA  GI: Sips and chips, /hr, zofran/reglan PRN, simethicone ATC, diet per gen surg  GYN: s/p CHERRY LONGO  : carvajal  Heme: 13.9>  Endo: Levothyroxine 125mcg qd  VTE ppx: SCDs  lines/drains: R abd LORETTA drain (11/30-)

## 2023-12-02 ENCOUNTER — TRANSCRIPTION ENCOUNTER (OUTPATIENT)
Age: 48
End: 2023-12-02

## 2023-12-02 VITALS
OXYGEN SATURATION: 95 % | TEMPERATURE: 99 F | RESPIRATION RATE: 17 BRPM | SYSTOLIC BLOOD PRESSURE: 103 MMHG | HEART RATE: 73 BPM | DIASTOLIC BLOOD PRESSURE: 66 MMHG

## 2023-12-02 LAB
ALBUMIN SERPL ELPH-MCNC: 3.4 G/DL — SIGNIFICANT CHANGE UP (ref 3.3–5)
ALBUMIN SERPL ELPH-MCNC: 3.4 G/DL — SIGNIFICANT CHANGE UP (ref 3.3–5)
ALP SERPL-CCNC: 45 U/L — SIGNIFICANT CHANGE UP (ref 40–120)
ALP SERPL-CCNC: 45 U/L — SIGNIFICANT CHANGE UP (ref 40–120)
ALT FLD-CCNC: 8 U/L — LOW (ref 10–45)
ALT FLD-CCNC: 8 U/L — LOW (ref 10–45)
ANION GAP SERPL CALC-SCNC: 10 MMOL/L — SIGNIFICANT CHANGE UP (ref 5–17)
ANION GAP SERPL CALC-SCNC: 10 MMOL/L — SIGNIFICANT CHANGE UP (ref 5–17)
AST SERPL-CCNC: 15 U/L — SIGNIFICANT CHANGE UP (ref 10–40)
AST SERPL-CCNC: 15 U/L — SIGNIFICANT CHANGE UP (ref 10–40)
BILIRUB SERPL-MCNC: 0.5 MG/DL — SIGNIFICANT CHANGE UP (ref 0.2–1.2)
BILIRUB SERPL-MCNC: 0.5 MG/DL — SIGNIFICANT CHANGE UP (ref 0.2–1.2)
BUN SERPL-MCNC: 8 MG/DL — SIGNIFICANT CHANGE UP (ref 7–23)
BUN SERPL-MCNC: 8 MG/DL — SIGNIFICANT CHANGE UP (ref 7–23)
CALCIUM SERPL-MCNC: 8.9 MG/DL — SIGNIFICANT CHANGE UP (ref 8.4–10.5)
CALCIUM SERPL-MCNC: 8.9 MG/DL — SIGNIFICANT CHANGE UP (ref 8.4–10.5)
CHLORIDE SERPL-SCNC: 107 MMOL/L — SIGNIFICANT CHANGE UP (ref 96–108)
CHLORIDE SERPL-SCNC: 107 MMOL/L — SIGNIFICANT CHANGE UP (ref 96–108)
CO2 SERPL-SCNC: 24 MMOL/L — SIGNIFICANT CHANGE UP (ref 22–31)
CO2 SERPL-SCNC: 24 MMOL/L — SIGNIFICANT CHANGE UP (ref 22–31)
CREAT SERPL-MCNC: 0.62 MG/DL — SIGNIFICANT CHANGE UP (ref 0.5–1.3)
CREAT SERPL-MCNC: 0.62 MG/DL — SIGNIFICANT CHANGE UP (ref 0.5–1.3)
EGFR: 109 ML/MIN/1.73M2 — SIGNIFICANT CHANGE UP
EGFR: 109 ML/MIN/1.73M2 — SIGNIFICANT CHANGE UP
GLUCOSE SERPL-MCNC: 93 MG/DL — SIGNIFICANT CHANGE UP (ref 70–99)
GLUCOSE SERPL-MCNC: 93 MG/DL — SIGNIFICANT CHANGE UP (ref 70–99)
HCT VFR BLD CALC: 32.2 % — LOW (ref 34.5–45)
HCT VFR BLD CALC: 32.2 % — LOW (ref 34.5–45)
HGB BLD-MCNC: 10.7 G/DL — LOW (ref 11.5–15.5)
HGB BLD-MCNC: 10.7 G/DL — LOW (ref 11.5–15.5)
MAGNESIUM SERPL-MCNC: 1.9 MG/DL — SIGNIFICANT CHANGE UP (ref 1.6–2.6)
MAGNESIUM SERPL-MCNC: 1.9 MG/DL — SIGNIFICANT CHANGE UP (ref 1.6–2.6)
MCHC RBC-ENTMCNC: 30.4 PG — SIGNIFICANT CHANGE UP (ref 27–34)
MCHC RBC-ENTMCNC: 30.4 PG — SIGNIFICANT CHANGE UP (ref 27–34)
MCHC RBC-ENTMCNC: 33.2 GM/DL — SIGNIFICANT CHANGE UP (ref 32–36)
MCHC RBC-ENTMCNC: 33.2 GM/DL — SIGNIFICANT CHANGE UP (ref 32–36)
MCV RBC AUTO: 91.5 FL — SIGNIFICANT CHANGE UP (ref 80–100)
MCV RBC AUTO: 91.5 FL — SIGNIFICANT CHANGE UP (ref 80–100)
NRBC # BLD: 0 /100 WBCS — SIGNIFICANT CHANGE UP (ref 0–0)
NRBC # BLD: 0 /100 WBCS — SIGNIFICANT CHANGE UP (ref 0–0)
PHOSPHATE SERPL-MCNC: 2.8 MG/DL — SIGNIFICANT CHANGE UP (ref 2.5–4.5)
PHOSPHATE SERPL-MCNC: 2.8 MG/DL — SIGNIFICANT CHANGE UP (ref 2.5–4.5)
PLATELET # BLD AUTO: 181 K/UL — SIGNIFICANT CHANGE UP (ref 150–400)
PLATELET # BLD AUTO: 181 K/UL — SIGNIFICANT CHANGE UP (ref 150–400)
POTASSIUM SERPL-MCNC: 4 MMOL/L — SIGNIFICANT CHANGE UP (ref 3.5–5.3)
POTASSIUM SERPL-MCNC: 4 MMOL/L — SIGNIFICANT CHANGE UP (ref 3.5–5.3)
POTASSIUM SERPL-SCNC: 4 MMOL/L — SIGNIFICANT CHANGE UP (ref 3.5–5.3)
POTASSIUM SERPL-SCNC: 4 MMOL/L — SIGNIFICANT CHANGE UP (ref 3.5–5.3)
PROT SERPL-MCNC: 6 G/DL — SIGNIFICANT CHANGE UP (ref 6–8.3)
PROT SERPL-MCNC: 6 G/DL — SIGNIFICANT CHANGE UP (ref 6–8.3)
RBC # BLD: 3.52 M/UL — LOW (ref 3.8–5.2)
RBC # BLD: 3.52 M/UL — LOW (ref 3.8–5.2)
RBC # FLD: 12.2 % — SIGNIFICANT CHANGE UP (ref 10.3–14.5)
RBC # FLD: 12.2 % — SIGNIFICANT CHANGE UP (ref 10.3–14.5)
SODIUM SERPL-SCNC: 141 MMOL/L — SIGNIFICANT CHANGE UP (ref 135–145)
SODIUM SERPL-SCNC: 141 MMOL/L — SIGNIFICANT CHANGE UP (ref 135–145)
WBC # BLD: 7.33 K/UL — SIGNIFICANT CHANGE UP (ref 3.8–10.5)
WBC # BLD: 7.33 K/UL — SIGNIFICANT CHANGE UP (ref 3.8–10.5)
WBC # FLD AUTO: 7.33 K/UL — SIGNIFICANT CHANGE UP (ref 3.8–10.5)
WBC # FLD AUTO: 7.33 K/UL — SIGNIFICANT CHANGE UP (ref 3.8–10.5)

## 2023-12-02 PROCEDURE — 84100 ASSAY OF PHOSPHORUS: CPT

## 2023-12-02 PROCEDURE — S2900: CPT

## 2023-12-02 PROCEDURE — 85027 COMPLETE CBC AUTOMATED: CPT

## 2023-12-02 PROCEDURE — 80053 COMPREHEN METABOLIC PANEL: CPT

## 2023-12-02 PROCEDURE — 86850 RBC ANTIBODY SCREEN: CPT

## 2023-12-02 PROCEDURE — 86901 BLOOD TYPING SEROLOGIC RH(D): CPT

## 2023-12-02 PROCEDURE — 88304 TISSUE EXAM BY PATHOLOGIST: CPT

## 2023-12-02 PROCEDURE — 86900 BLOOD TYPING SEROLOGIC ABO: CPT

## 2023-12-02 PROCEDURE — 83735 ASSAY OF MAGNESIUM: CPT

## 2023-12-02 PROCEDURE — 82962 GLUCOSE BLOOD TEST: CPT

## 2023-12-02 PROCEDURE — 85025 COMPLETE CBC W/AUTO DIFF WBC: CPT

## 2023-12-02 PROCEDURE — 88305 TISSUE EXAM BY PATHOLOGIST: CPT

## 2023-12-02 PROCEDURE — 80048 BASIC METABOLIC PNL TOTAL CA: CPT

## 2023-12-02 PROCEDURE — 88307 TISSUE EXAM BY PATHOLOGIST: CPT

## 2023-12-02 PROCEDURE — 36415 COLL VENOUS BLD VENIPUNCTURE: CPT

## 2023-12-02 RX ORDER — MAGNESIUM SULFATE 500 MG/ML
2 VIAL (ML) INJECTION ONCE
Refills: 0 | Status: COMPLETED | OUTPATIENT
Start: 2023-12-02 | End: 2023-12-02

## 2023-12-02 RX ADMIN — Medication 1000 MILLIGRAM(S): at 05:27

## 2023-12-02 RX ADMIN — Medication 1000 MILLIGRAM(S): at 06:20

## 2023-12-02 RX ADMIN — Medication 600 MILLIGRAM(S): at 11:26

## 2023-12-02 RX ADMIN — SIMETHICONE 80 MILLIGRAM(S): 80 TABLET, CHEWABLE ORAL at 11:26

## 2023-12-02 RX ADMIN — Medication 25 GRAM(S): at 10:09

## 2023-12-02 RX ADMIN — Medication 125 MICROGRAM(S): at 05:27

## 2023-12-02 RX ADMIN — Medication 1000 MILLIGRAM(S): at 11:25

## 2023-12-02 RX ADMIN — Medication 600 MILLIGRAM(S): at 12:26

## 2023-12-02 RX ADMIN — Medication 600 MILLIGRAM(S): at 05:27

## 2023-12-02 RX ADMIN — HEPARIN SODIUM 5000 UNIT(S): 5000 INJECTION INTRAVENOUS; SUBCUTANEOUS at 05:26

## 2023-12-02 RX ADMIN — SIMETHICONE 80 MILLIGRAM(S): 80 TABLET, CHEWABLE ORAL at 05:27

## 2023-12-02 RX ADMIN — Medication 1000 MILLIGRAM(S): at 12:25

## 2023-12-02 RX ADMIN — Medication 600 MILLIGRAM(S): at 06:20

## 2023-12-02 NOTE — PROGRESS NOTE ADULT - ASSESSMENT
49yo s/p scheduled RA SUE, BS, gens surg (Sudha) performed significant CYN, excision of L peritoneal 14cm simple cyst, R ovarian cystectomy, flex sig w/ air leak test, LORETTA drain placement for endometriosis and adenomyosis, chronic pelvic pain. Dense adhesions in posterior cul-de-sac between the 14cm peritoneal cyst, sigmoid, posterior uterus, and b/l adnexa.    Neuro: tylenol/motrin ATC, oxy prn,   ENT: corneal abrasion resolving - s/p ofloxacin 4x/d x 1d  CV: VSS, Atorvastatin 20mg qd   Pulm: RA  GI: FLD, zofran/reglan PRN, simethicone ATC, protonix, diet per gen surg +/-  GYN: s/p CHERRY LONGO  : s/p carvajal, voiding  Heme: 13.9>11.4  Endo: Levothyroxine 125mcg qd  VTE ppx: SCDs, SQH q12h  lines/drains: R abd LORETTA drain (11/30-)

## 2023-12-02 NOTE — PROGRESS NOTE ADULT - SUBJECTIVE AND OBJECTIVE BOX
No abdominal pain. C/o hazy vision...electrolytes normal  Had BM  Afebrile VSS  Lungs clear  Abdomen soft, wounds clean and dry  Ext No C/C/E. R arm swelling getting better  Voiding freely  Hb 11.4  LORETTA drainage 32 ml    Plan  LORETTA removed  Will follow eye haziness  May need to see ophthalmologist if haziness persists  Discharge home

## 2023-12-02 NOTE — PROGRESS NOTE ADULT - SUBJECTIVE AND OBJECTIVE BOX
Patient evaluated at the bedside. No acute events.  Denies CP/SOB/dizziness/nausea/vomiting/abdominal pain/calf pain.  Pain well controlled on oral pain medications. Patient is ambulating independently, passing flatus and tolerating a regular diet.    O:   T(C): 37 (12-02-23 @ 08:10), Max: 37 (12-02-23 @ 08:10)  HR: 73 (12-02-23 @ 08:10) (69 - 92)  BP: 103/66 (12-02-23 @ 08:10) (92/59 - 104/69)  RR: 17 (12-02-23 @ 08:10) (16 - 18)  SpO2: 95% (12-02-23 @ 08:10) (95% - 100%)  Wt(kg): --    GEN: patient appears well  LUNGS: no respiratory distress  ABD: soft, appropriately tender, non distended, incision clean and dry  EXT: no calf tenderness      12-01 @ 07:01  -  12-02 @ 07:00  --------------------------------------------------------  IN: 0 mL / OUT: 1382.5 mL / NET: -1382.5 mL      WBC Count: 7.33 K/uL (12-02-23 @ 05:48)  Hemoglobin: 10.7 g/dL *L* (12-02-23 @ 05:48)  Creatinine: 0.62 mg/dL (12-02-23 @ 05:48)     Patient evaluated at the bedside. No acute events.  Denies CP/SOB/dizziness/nausea/vomiting/abdominal pain/calf pain.  Pain well controlled on oral pain medications. Patient is ambulating independently, passing flatus and tolerating a full liquid diet.    O:   T(C): 37 (12-02-23 @ 08:10), Max: 37 (12-02-23 @ 08:10)  HR: 73 (12-02-23 @ 08:10) (69 - 92)  BP: 103/66 (12-02-23 @ 08:10) (92/59 - 104/69)  RR: 17 (12-02-23 @ 08:10) (16 - 18)  SpO2: 95% (12-02-23 @ 08:10) (95% - 100%)  Wt(kg): --    GEN: patient appears well  LUNGS: no respiratory distress  ABD: soft, appropriately tender, non distended, incision clean and dry  EXT: no calf tenderness      12-01 @ 07:01  -  12-02 @ 07:00  --------------------------------------------------------  IN: 0 mL / OUT: 1382.5 mL / NET: -1382.5 mL      WBC Count: 7.33 K/uL (12-02-23 @ 05:48)  Hemoglobin: 10.7 g/dL *L* (12-02-23 @ 05:48)  Creatinine: 0.62 mg/dL (12-02-23 @ 05:48)

## 2023-12-02 NOTE — PROGRESS NOTE ADULT - ASSESSMENT
49yo s/p scheduled RA TLH, BS, gens surg (Sudha) performed significant CYN, excision of L peritoneal 14cm simple cyst, R ovarian cystectomy, flex sig w/ air leak test, LORETTA drain placement for endometriosis and adenomyosis, chronic pelvic pain. Dense adhesions in posterior cul-de-sac between the 14cm peritoneal cyst, sigmoid, posterior uterus, and b/l adnexa.     - CLD, await further ROBF  - OOB as tolerated/ IS/ SCDs  - SQH  - Rest of care per primary.    -okay with discharge for today, should go home on fulls until Patient has BM, follow up with Dr. Haley in one week 47yo s/p scheduled RA TLH, BS, gens surg (Sudha) performed significant CYN, excision of L peritoneal 14cm simple cyst, R ovarian cystectomy, flex sig w/ air leak test, LORETTA drain placement for endometriosis and adenomyosis, chronic pelvic pain. Dense adhesions in posterior cul-de-sac between the 14cm peritoneal cyst, sigmoid, posterior uterus, and b/l adnexa.     - adv to fulls, await further ROBF  - OOB as tolerated/ IS/ SCDs  - SQH  - Rest of care per primary.    -no general surgery contraindications to discharge, should go home on fulls until Patient has BM, then can adv as tolerated, LORETTA drain should remain upon discharge with care instruction and to follow up with Dr. Haley in one week  - Team 4c will continue to follow

## 2023-12-02 NOTE — PROGRESS NOTE ADULT - SUBJECTIVE AND OBJECTIVE BOX
Overnight events:       POD#    SUBJECTIVE:      MEDICATIONS  (STANDING):  acetaminophen     Tablet .. 1000 milliGRAM(s) Oral every 6 hours  atorvastatin 20 milliGRAM(s) Oral at bedtime  heparin   Injectable 5000 Unit(s) SubCutaneous every 12 hours  ibuprofen  Tablet. 600 milliGRAM(s) Oral every 6 hours  levothyroxine 125 MICROGram(s) Oral daily  pantoprazole  Injectable 20 milliGRAM(s) IV Push daily  simethicone 80 milliGRAM(s) Chew every 6 hours    MEDICATIONS  (PRN):  HYDROmorphone  Injectable 0.5 milliGRAM(s) IV Push every 15 minutes PRN breakthrough pain in the PACU  metoclopramide Injectable 10 milliGRAM(s) IV Push every 8 hours PRN Nausea and/or Vomiting  ondansetron Injectable 8 milliGRAM(s) IV Push every 8 hours PRN Nausea and/or Vomiting  oxyCODONE    IR 10 milliGRAM(s) Oral every 4 hours PRN Severe Pain (7 - 10)  oxyCODONE    IR 5 milliGRAM(s) Oral every 4 hours PRN Moderate Pain (4 - 6)      Vital Signs Last 24 Hrs  T(C): 36.8 (02 Dec 2023 05:50), Max: 36.8 (01 Dec 2023 12:18)  T(F): 98.2 (02 Dec 2023 05:50), Max: 98.3 (01 Dec 2023 12:18)  HR: 70 (02 Dec 2023 05:50) (69 - 92)  BP: 101/65 (02 Dec 2023 05:50) (92/59 - 104/69)  BP(mean): --  RR: 18 (02 Dec 2023 05:50) (16 - 18)  SpO2: 95% (02 Dec 2023 05:50) (95% - 100%)    Parameters below as of 02 Dec 2023 05:50  Patient On (Oxygen Delivery Method): room air        Physical Exam:  General: NAD, resting comfortably in bed  Pulmonary: Nonlabored breathing, no respiratory distress  Cardiovascular: NSR  Abdominal: soft, NT/ND  Extremities: WWP, normal strength  Neuro: A/O x 3, CNs II-XII grossly intact, no focal deficits, normal motor/sensation  Pulses: palpable distal pulses    I&O's Summary    30 Nov 2023 07:01  -  01 Dec 2023 07:00  --------------------------------------------------------  IN: 1875 mL / OUT: 3185 mL / NET: -1310 mL    01 Dec 2023 07:01  -  02 Dec 2023 06:05  --------------------------------------------------------  IN: 0 mL / OUT: 1382.5 mL / NET: -1382.5 mL        LABS:                        10.7   7.33  )-----------( 181      ( 02 Dec 2023 05:48 )             32.2     12-01    142  |  107  |  10  ----------------------------<  91  3.6   |  26  |  0.68    Ca    8.9      01 Dec 2023 06:57  Phos  3.6     12-01  Mg     2.0     12-01        Urinalysis Basic - ( 01 Dec 2023 06:57 )    Color: x / Appearance: x / SG: x / pH: x  Gluc: 91 mg/dL / Ketone: x  / Bili: x / Urobili: x   Blood: x / Protein: x / Nitrite: x   Leuk Esterase: x / RBC: x / WBC x   Sq Epi: x / Non Sq Epi: x / Bacteria: x      CAPILLARY BLOOD GLUCOSE            RADIOLOGY & ADDITIONAL STUDIES:   Overnight events:       POD#2 Lap total hysterectomy with bilateral salpingectomy and cystoscopy     SUBJECTIVE:      MEDICATIONS  (STANDING):  acetaminophen     Tablet .. 1000 milliGRAM(s) Oral every 6 hours  atorvastatin 20 milliGRAM(s) Oral at bedtime  heparin   Injectable 5000 Unit(s) SubCutaneous every 12 hours  ibuprofen  Tablet. 600 milliGRAM(s) Oral every 6 hours  levothyroxine 125 MICROGram(s) Oral daily  pantoprazole  Injectable 20 milliGRAM(s) IV Push daily  simethicone 80 milliGRAM(s) Chew every 6 hours    MEDICATIONS  (PRN):  HYDROmorphone  Injectable 0.5 milliGRAM(s) IV Push every 15 minutes PRN breakthrough pain in the PACU  metoclopramide Injectable 10 milliGRAM(s) IV Push every 8 hours PRN Nausea and/or Vomiting  ondansetron Injectable 8 milliGRAM(s) IV Push every 8 hours PRN Nausea and/or Vomiting  oxyCODONE    IR 10 milliGRAM(s) Oral every 4 hours PRN Severe Pain (7 - 10)  oxyCODONE    IR 5 milliGRAM(s) Oral every 4 hours PRN Moderate Pain (4 - 6)      Vital Signs Last 24 Hrs  T(C): 36.8 (02 Dec 2023 05:50), Max: 36.8 (01 Dec 2023 12:18)  T(F): 98.2 (02 Dec 2023 05:50), Max: 98.3 (01 Dec 2023 12:18)  HR: 70 (02 Dec 2023 05:50) (69 - 92)  BP: 101/65 (02 Dec 2023 05:50) (92/59 - 104/69)  BP(mean): --  RR: 18 (02 Dec 2023 05:50) (16 - 18)  SpO2: 95% (02 Dec 2023 05:50) (95% - 100%)    Parameters below as of 02 Dec 2023 05:50  Patient On (Oxygen Delivery Method): room air        Physical Exam:  General: NAD, resting comfortably in bed  Pulmonary: Nonlabored breathing, no respiratory distress  Cardiovascular: NSR  Abdominal: soft, NT/ND, LORETTA drain serous  Extremities: WWP, normal strength  Neuro: A/O x 3, CNs II-XII grossly intact,    I&O's Summary    30 Nov 2023 07:01  -  01 Dec 2023 07:00  --------------------------------------------------------  IN: 1875 mL / OUT: 3185 mL / NET: -1310 mL    01 Dec 2023 07:01  -  02 Dec 2023 06:05  --------------------------------------------------------  IN: 0 mL / OUT: 1382.5 mL / NET: -1382.5 mL        LABS:                        10.7   7.33  )-----------( 181      ( 02 Dec 2023 05:48 )             32.2     12-01    142  |  107  |  10  ----------------------------<  91  3.6   |  26  |  0.68    Ca    8.9      01 Dec 2023 06:57  Phos  3.6     12-01  Mg     2.0     12-01        Urinalysis Basic - ( 01 Dec 2023 06:57 )    Color: x / Appearance: x / SG: x / pH: x  Gluc: 91 mg/dL / Ketone: x  / Bili: x / Urobili: x   Blood: x / Protein: x / Nitrite: x   Leuk Esterase: x / RBC: x / WBC x   Sq Epi: x / Non Sq Epi: x / Bacteria: x      CAPILLARY BLOOD GLUCOSE            RADIOLOGY & ADDITIONAL STUDIES:   Overnight events: No acute overnight events.       POD#2 Lap total hysterectomy with bilateral salpingectomy and cystoscopy     SUBJECTIVE: Patient seen at bedside with chief resident. Patient reports doing well. Tolerate diet. Denies any nausea.       MEDICATIONS  (STANDING):  acetaminophen     Tablet .. 1000 milliGRAM(s) Oral every 6 hours  atorvastatin 20 milliGRAM(s) Oral at bedtime  heparin   Injectable 5000 Unit(s) SubCutaneous every 12 hours  ibuprofen  Tablet. 600 milliGRAM(s) Oral every 6 hours  levothyroxine 125 MICROGram(s) Oral daily  pantoprazole  Injectable 20 milliGRAM(s) IV Push daily  simethicone 80 milliGRAM(s) Chew every 6 hours    MEDICATIONS  (PRN):  HYDROmorphone  Injectable 0.5 milliGRAM(s) IV Push every 15 minutes PRN breakthrough pain in the PACU  metoclopramide Injectable 10 milliGRAM(s) IV Push every 8 hours PRN Nausea and/or Vomiting  ondansetron Injectable 8 milliGRAM(s) IV Push every 8 hours PRN Nausea and/or Vomiting  oxyCODONE    IR 10 milliGRAM(s) Oral every 4 hours PRN Severe Pain (7 - 10)  oxyCODONE    IR 5 milliGRAM(s) Oral every 4 hours PRN Moderate Pain (4 - 6)      Vital Signs Last 24 Hrs  T(C): 36.8 (02 Dec 2023 05:50), Max: 36.8 (01 Dec 2023 12:18)  T(F): 98.2 (02 Dec 2023 05:50), Max: 98.3 (01 Dec 2023 12:18)  HR: 70 (02 Dec 2023 05:50) (69 - 92)  BP: 101/65 (02 Dec 2023 05:50) (92/59 - 104/69)  BP(mean): --  RR: 18 (02 Dec 2023 05:50) (16 - 18)  SpO2: 95% (02 Dec 2023 05:50) (95% - 100%)    Parameters below as of 02 Dec 2023 05:50  Patient On (Oxygen Delivery Method): room air        Physical Exam:  General: NAD, resting comfortably in bed  Pulmonary: Nonlabored breathing, no respiratory distress  Cardiovascular: NSR  Abdominal: soft, NT/ND, LORETTA drain serous  Extremities: WWP, normal strength  Neuro: A/O x 3, CNs II-XII grossly intact,    I&O's Summary    30 Nov 2023 07:01  -  01 Dec 2023 07:00  --------------------------------------------------------  IN: 1875 mL / OUT: 3185 mL / NET: -1310 mL    01 Dec 2023 07:01  -  02 Dec 2023 06:05  --------------------------------------------------------  IN: 0 mL / OUT: 1382.5 mL / NET: -1382.5 mL        LABS:                        10.7   7.33  )-----------( 181      ( 02 Dec 2023 05:48 )             32.2     12-01    142  |  107  |  10  ----------------------------<  91  3.6   |  26  |  0.68    Ca    8.9      01 Dec 2023 06:57  Phos  3.6     12-01  Mg     2.0     12-01        Urinalysis Basic - ( 01 Dec 2023 06:57 )    Color: x / Appearance: x / SG: x / pH: x  Gluc: 91 mg/dL / Ketone: x  / Bili: x / Urobili: x   Blood: x / Protein: x / Nitrite: x   Leuk Esterase: x / RBC: x / WBC x   Sq Epi: x / Non Sq Epi: x / Bacteria: x      CAPILLARY BLOOD GLUCOSE            RADIOLOGY & ADDITIONAL STUDIES:

## 2023-12-02 NOTE — PROGRESS NOTE ADULT - REASON FOR ADMISSION
post op pain control

## 2023-12-06 ENCOUNTER — EMERGENCY (EMERGENCY)
Facility: HOSPITAL | Age: 48
LOS: 1 days | Discharge: HOME | End: 2023-12-06
Attending: STUDENT IN AN ORGANIZED HEALTH CARE EDUCATION/TRAINING PROGRAM | Admitting: STUDENT IN AN ORGANIZED HEALTH CARE EDUCATION/TRAINING PROGRAM
Payer: COMMERCIAL

## 2023-12-06 VITALS
RESPIRATION RATE: 18 BRPM | OXYGEN SATURATION: 98 % | HEART RATE: 114 BPM | HEIGHT: 61 IN | SYSTOLIC BLOOD PRESSURE: 119 MMHG | DIASTOLIC BLOOD PRESSURE: 77 MMHG | TEMPERATURE: 98 F | WEIGHT: 162.92 LBS

## 2023-12-06 VITALS
HEART RATE: 90 BPM | DIASTOLIC BLOOD PRESSURE: 56 MMHG | SYSTOLIC BLOOD PRESSURE: 102 MMHG | TEMPERATURE: 100 F | RESPIRATION RATE: 18 BRPM | OXYGEN SATURATION: 96 %

## 2023-12-06 DIAGNOSIS — Z98.890 OTHER SPECIFIED POSTPROCEDURAL STATES: Chronic | ICD-10-CM

## 2023-12-06 DIAGNOSIS — R07.81 PLEURODYNIA: ICD-10-CM

## 2023-12-06 DIAGNOSIS — G89.29 OTHER CHRONIC PAIN: ICD-10-CM

## 2023-12-06 DIAGNOSIS — Z90.49 ACQUIRED ABSENCE OF OTHER SPECIFIED PARTS OF DIGESTIVE TRACT: Chronic | ICD-10-CM

## 2023-12-06 DIAGNOSIS — M06.9 RHEUMATOID ARTHRITIS, UNSPECIFIED: ICD-10-CM

## 2023-12-06 DIAGNOSIS — R00.0 TACHYCARDIA, UNSPECIFIED: ICD-10-CM

## 2023-12-06 DIAGNOSIS — R06.02 SHORTNESS OF BREATH: ICD-10-CM

## 2023-12-06 DIAGNOSIS — E87.1 HYPO-OSMOLALITY AND HYPONATREMIA: ICD-10-CM

## 2023-12-06 DIAGNOSIS — Z98.891 HISTORY OF UTERINE SCAR FROM PREVIOUS SURGERY: Chronic | ICD-10-CM

## 2023-12-06 DIAGNOSIS — E06.3 AUTOIMMUNE THYROIDITIS: ICD-10-CM

## 2023-12-06 DIAGNOSIS — N60.01 SOLITARY CYST OF RIGHT BREAST: ICD-10-CM

## 2023-12-06 DIAGNOSIS — N60.02 SOLITARY CYST OF LEFT BREAST: ICD-10-CM

## 2023-12-06 DIAGNOSIS — R10.9 UNSPECIFIED ABDOMINAL PAIN: ICD-10-CM

## 2023-12-06 LAB
ANION GAP SERPL CALC-SCNC: 12 MMOL/L — SIGNIFICANT CHANGE UP (ref 5–17)
ANION GAP SERPL CALC-SCNC: 12 MMOL/L — SIGNIFICANT CHANGE UP (ref 5–17)
APTT BLD: 31.1 SEC — SIGNIFICANT CHANGE UP (ref 24.5–35.6)
APTT BLD: 31.1 SEC — SIGNIFICANT CHANGE UP (ref 24.5–35.6)
BASOPHILS # BLD AUTO: 0.09 K/UL — SIGNIFICANT CHANGE UP (ref 0–0.2)
BASOPHILS # BLD AUTO: 0.09 K/UL — SIGNIFICANT CHANGE UP (ref 0–0.2)
BASOPHILS NFR BLD AUTO: 1 % — SIGNIFICANT CHANGE UP (ref 0–2)
BASOPHILS NFR BLD AUTO: 1 % — SIGNIFICANT CHANGE UP (ref 0–2)
BUN SERPL-MCNC: 11 MG/DL — SIGNIFICANT CHANGE UP (ref 7–23)
BUN SERPL-MCNC: 11 MG/DL — SIGNIFICANT CHANGE UP (ref 7–23)
CALCIUM SERPL-MCNC: 9.7 MG/DL — SIGNIFICANT CHANGE UP (ref 8.4–10.5)
CALCIUM SERPL-MCNC: 9.7 MG/DL — SIGNIFICANT CHANGE UP (ref 8.4–10.5)
CHLORIDE SERPL-SCNC: 97 MMOL/L — SIGNIFICANT CHANGE UP (ref 96–108)
CHLORIDE SERPL-SCNC: 97 MMOL/L — SIGNIFICANT CHANGE UP (ref 96–108)
CO2 SERPL-SCNC: 23 MMOL/L — SIGNIFICANT CHANGE UP (ref 22–31)
CO2 SERPL-SCNC: 23 MMOL/L — SIGNIFICANT CHANGE UP (ref 22–31)
CREAT SERPL-MCNC: 0.64 MG/DL — SIGNIFICANT CHANGE UP (ref 0.5–1.3)
CREAT SERPL-MCNC: 0.64 MG/DL — SIGNIFICANT CHANGE UP (ref 0.5–1.3)
EGFR: 108 ML/MIN/1.73M2 — SIGNIFICANT CHANGE UP
EGFR: 108 ML/MIN/1.73M2 — SIGNIFICANT CHANGE UP
EOSINOPHIL # BLD AUTO: 0.67 K/UL — HIGH (ref 0–0.5)
EOSINOPHIL # BLD AUTO: 0.67 K/UL — HIGH (ref 0–0.5)
EOSINOPHIL NFR BLD AUTO: 7.7 % — HIGH (ref 0–6)
EOSINOPHIL NFR BLD AUTO: 7.7 % — HIGH (ref 0–6)
GLUCOSE SERPL-MCNC: 85 MG/DL — SIGNIFICANT CHANGE UP (ref 70–99)
GLUCOSE SERPL-MCNC: 85 MG/DL — SIGNIFICANT CHANGE UP (ref 70–99)
HCT VFR BLD CALC: 35.6 % — SIGNIFICANT CHANGE UP (ref 34.5–45)
HCT VFR BLD CALC: 35.6 % — SIGNIFICANT CHANGE UP (ref 34.5–45)
HGB BLD-MCNC: 12.2 G/DL — SIGNIFICANT CHANGE UP (ref 11.5–15.5)
HGB BLD-MCNC: 12.2 G/DL — SIGNIFICANT CHANGE UP (ref 11.5–15.5)
IMM GRANULOCYTES NFR BLD AUTO: 0.6 % — SIGNIFICANT CHANGE UP (ref 0–0.9)
IMM GRANULOCYTES NFR BLD AUTO: 0.6 % — SIGNIFICANT CHANGE UP (ref 0–0.9)
INR BLD: 1.04 — SIGNIFICANT CHANGE UP (ref 0.85–1.18)
INR BLD: 1.04 — SIGNIFICANT CHANGE UP (ref 0.85–1.18)
LYMPHOCYTES # BLD AUTO: 2.06 K/UL — SIGNIFICANT CHANGE UP (ref 1–3.3)
LYMPHOCYTES # BLD AUTO: 2.06 K/UL — SIGNIFICANT CHANGE UP (ref 1–3.3)
LYMPHOCYTES # BLD AUTO: 23.7 % — SIGNIFICANT CHANGE UP (ref 13–44)
LYMPHOCYTES # BLD AUTO: 23.7 % — SIGNIFICANT CHANGE UP (ref 13–44)
MAGNESIUM SERPL-MCNC: 1.9 MG/DL — SIGNIFICANT CHANGE UP (ref 1.6–2.6)
MAGNESIUM SERPL-MCNC: 1.9 MG/DL — SIGNIFICANT CHANGE UP (ref 1.6–2.6)
MCHC RBC-ENTMCNC: 30.1 PG — SIGNIFICANT CHANGE UP (ref 27–34)
MCHC RBC-ENTMCNC: 30.1 PG — SIGNIFICANT CHANGE UP (ref 27–34)
MCHC RBC-ENTMCNC: 34.3 GM/DL — SIGNIFICANT CHANGE UP (ref 32–36)
MCHC RBC-ENTMCNC: 34.3 GM/DL — SIGNIFICANT CHANGE UP (ref 32–36)
MCV RBC AUTO: 87.9 FL — SIGNIFICANT CHANGE UP (ref 80–100)
MCV RBC AUTO: 87.9 FL — SIGNIFICANT CHANGE UP (ref 80–100)
MONOCYTES # BLD AUTO: 0.86 K/UL — SIGNIFICANT CHANGE UP (ref 0–0.9)
MONOCYTES # BLD AUTO: 0.86 K/UL — SIGNIFICANT CHANGE UP (ref 0–0.9)
MONOCYTES NFR BLD AUTO: 9.9 % — SIGNIFICANT CHANGE UP (ref 2–14)
MONOCYTES NFR BLD AUTO: 9.9 % — SIGNIFICANT CHANGE UP (ref 2–14)
NEUTROPHILS # BLD AUTO: 4.97 K/UL — SIGNIFICANT CHANGE UP (ref 1.8–7.4)
NEUTROPHILS # BLD AUTO: 4.97 K/UL — SIGNIFICANT CHANGE UP (ref 1.8–7.4)
NEUTROPHILS NFR BLD AUTO: 57.1 % — SIGNIFICANT CHANGE UP (ref 43–77)
NEUTROPHILS NFR BLD AUTO: 57.1 % — SIGNIFICANT CHANGE UP (ref 43–77)
NRBC # BLD: 0 /100 WBCS — SIGNIFICANT CHANGE UP (ref 0–0)
NRBC # BLD: 0 /100 WBCS — SIGNIFICANT CHANGE UP (ref 0–0)
NT-PROBNP SERPL-SCNC: <36 PG/ML — SIGNIFICANT CHANGE UP (ref 0–300)
NT-PROBNP SERPL-SCNC: <36 PG/ML — SIGNIFICANT CHANGE UP (ref 0–300)
PLATELET # BLD AUTO: 263 K/UL — SIGNIFICANT CHANGE UP (ref 150–400)
PLATELET # BLD AUTO: 263 K/UL — SIGNIFICANT CHANGE UP (ref 150–400)
POTASSIUM SERPL-MCNC: 4.8 MMOL/L — SIGNIFICANT CHANGE UP (ref 3.5–5.3)
POTASSIUM SERPL-MCNC: 4.8 MMOL/L — SIGNIFICANT CHANGE UP (ref 3.5–5.3)
POTASSIUM SERPL-SCNC: 4.8 MMOL/L — SIGNIFICANT CHANGE UP (ref 3.5–5.3)
POTASSIUM SERPL-SCNC: 4.8 MMOL/L — SIGNIFICANT CHANGE UP (ref 3.5–5.3)
PROTHROM AB SERPL-ACNC: 11.8 SEC — SIGNIFICANT CHANGE UP (ref 9.5–13)
PROTHROM AB SERPL-ACNC: 11.8 SEC — SIGNIFICANT CHANGE UP (ref 9.5–13)
RBC # BLD: 4.05 M/UL — SIGNIFICANT CHANGE UP (ref 3.8–5.2)
RBC # BLD: 4.05 M/UL — SIGNIFICANT CHANGE UP (ref 3.8–5.2)
RBC # FLD: 12 % — SIGNIFICANT CHANGE UP (ref 10.3–14.5)
RBC # FLD: 12 % — SIGNIFICANT CHANGE UP (ref 10.3–14.5)
SODIUM SERPL-SCNC: 132 MMOL/L — LOW (ref 135–145)
SODIUM SERPL-SCNC: 132 MMOL/L — LOW (ref 135–145)
TROPONIN T, HIGH SENSITIVITY RESULT: <6 NG/L — SIGNIFICANT CHANGE UP (ref 0–51)
TROPONIN T, HIGH SENSITIVITY RESULT: <6 NG/L — SIGNIFICANT CHANGE UP (ref 0–51)
WBC # BLD: 8.7 K/UL — SIGNIFICANT CHANGE UP (ref 3.8–10.5)
WBC # BLD: 8.7 K/UL — SIGNIFICANT CHANGE UP (ref 3.8–10.5)
WBC # FLD AUTO: 8.7 K/UL — SIGNIFICANT CHANGE UP (ref 3.8–10.5)
WBC # FLD AUTO: 8.7 K/UL — SIGNIFICANT CHANGE UP (ref 3.8–10.5)

## 2023-12-06 PROCEDURE — 85025 COMPLETE CBC W/AUTO DIFF WBC: CPT

## 2023-12-06 PROCEDURE — 99285 EMERGENCY DEPT VISIT HI MDM: CPT

## 2023-12-06 PROCEDURE — 80048 BASIC METABOLIC PNL TOTAL CA: CPT

## 2023-12-06 PROCEDURE — 99284 EMERGENCY DEPT VISIT MOD MDM: CPT | Mod: 25

## 2023-12-06 PROCEDURE — 83735 ASSAY OF MAGNESIUM: CPT

## 2023-12-06 PROCEDURE — 71275 CT ANGIOGRAPHY CHEST: CPT | Mod: MA

## 2023-12-06 PROCEDURE — 85610 PROTHROMBIN TIME: CPT

## 2023-12-06 PROCEDURE — 71275 CT ANGIOGRAPHY CHEST: CPT | Mod: 26,MA

## 2023-12-06 PROCEDURE — 84484 ASSAY OF TROPONIN QUANT: CPT

## 2023-12-06 PROCEDURE — 85730 THROMBOPLASTIN TIME PARTIAL: CPT

## 2023-12-06 PROCEDURE — 36415 COLL VENOUS BLD VENIPUNCTURE: CPT

## 2023-12-06 PROCEDURE — 83880 ASSAY OF NATRIURETIC PEPTIDE: CPT

## 2023-12-06 NOTE — ED PROVIDER NOTE - CLINICAL SUMMARY MEDICAL DECISION MAKING FREE TEXT BOX
48-year-old female presenting with pleuritic chest pain with shortness of breath, EKG with sinus tachycardia with ST changes, moderate risk [place], will check labs including BNP and troponin for possible right heart strain, CTA rule out PE, reassess.  Discussed with CT tech, will bypass labs.

## 2023-12-06 NOTE — ED PROVIDER NOTE - PHYSICAL EXAMINATION
general: Well appearing, in no acute distress  HEENT: Normocephalic, atraumatic, extraocular movements intact  CV: Tachycardic  Pulm: No respiratory distress, no tachypnea  Abd: Flat, no gross distension  Ext: warm and well perfused, no le edema  Skin: No gross rashes or lesions  Neuro: Alert and oriented, moving all extremities

## 2023-12-06 NOTE — ED ADULT NURSE NOTE - OBJECTIVE STATEMENT
Intermittent chest pain since hysterectomy on 11/30 with shortness of breath reported today while at rest. Pt denies fevers or chills. Pt denies N/V/D. Pt alert, oriented, and ambulatory at time of assessment. Pt in NAD @ time of assessment. Denies dizziness, weakness, or syncope.

## 2023-12-06 NOTE — ED ADULT NURSE NOTE - NSFALLUNIVINTERV_ED_ALL_ED
Bed/Stretcher in lowest position, wheels locked, appropriate side rails in place/Call bell, personal items and telephone in reach/Instruct patient to call for assistance before getting out of bed/chair/stretcher/Non-slip footwear applied when patient is off stretcher/[place] to call system/Physically safe environment - no spills, clutter or unnecessary equipment/Purposeful proactive rounding/Room/bathroom lighting operational, light cord in reach Bed/Stretcher in lowest position, wheels locked, appropriate side rails in place/Call bell, personal items and telephone in reach/Instruct patient to call for assistance before getting out of bed/chair/stretcher/Non-slip footwear applied when patient is off stretcher/[place] to call system/Physically safe environment - no spills, clutter or unnecessary equipment/Purposeful proactive rounding/Room/bathroom lighting operational, light cord in reach

## 2023-12-06 NOTE — CONSULT NOTE ADULT - SUBJECTIVE AND OBJECTIVE BOX
47yo  POD6 s/p RA TLH, BS, right ovarian cystectomy, peritoneal inclusion cystectomy, flexible sigmoidoscopy with negative air leak test, and LORETTA drain placement for endometriosis, adenomyosis, and chronic pelvic pain, presenting for SOB and chest pain. The surgery was c/b significant adhesions and general surgery assisted in lysis of adhesions. EBL 500cc, post op Hb 10.7. The post operative course was c/b significant hand swelling from IV infiltration (resolved) and a corneal abrasion (resolved). She was discharged home on POD2, ambulating without assistance, voiding, tolerating PO, passing flatus, LORETTA drain removed with minimal output, and VSS. She reports that pain has been well controlled at home with Tylenol/Motrin and she has continued to meet all post op milestones, having regular BM at this time. She states that while she was inpatient she noticed occasional L abdominal pain with deep inspiration which she attributed to post operative pain and the placement of LORETTA drain on the L side. However, when she went home, she noted that this pain persistent with deep inhalation in the upper left quadrant/left chest while her generalized abdominal pain was well controlled. She also noted that she began to feel SOB easily while talking and ambulating, and that she felt her heart racing. She denies having these sx prior to surgery. She reports that she notes elevated HR to 120s-130s while ambulating (per HR on apple watch) and baseline around 115. She denies headache, CP, N/V, constipation, dysuria, fever, chills, vaginal bleeding or discharge.     OB: , G1  , G2 pCS   GYN: hx of endometriosis, adenomyosis, chronic pelvic pain. Denies hx of fibroids, stds, abnormal pap smears.   PMH: Hashimoto's, HLD  PSH:  - R club foot surgery  - open appendectomy   - pCS  - abdominal myomectomy  - R meniscectomy   - L/S ovarian cystectomy   - L meniscectomy   - RA TLH, BS, right ovarian cystectomy, peritoneal inclusion cystectomy, flexible sigmoidoscopy with negative air leak test  Meds:   -Spironolactone 25mg qd (hair loss)  -Synthroid 125 mcgs qd  -Atorvastatin 20mg qd   Allergies: NKDA    PHYSICAL EXAM:   Vital Signs Last 24 Hrs  T(C): 37.6 (06 Dec 2023 18:18), Max: 37.6 (06 Dec 2023 18:18)  T(F): 99.6 (06 Dec 2023 18:18), Max: 99.6 (06 Dec 2023 18:18)  HR: 90 (06 Dec 2023 18:18) (90 - 114)  BP: 102/56 (06 Dec 2023 18:18) (102/56 - 119/77)  RR: 18 (06 Dec 2023 18:18) (18 - 18)  SpO2: 96% (06 Dec 2023 18:18) (96% - 98%)    Parameters below as of 06 Dec 2023 18:18  Patient On (Oxygen Delivery Method): room air    **************************  Constitutional: No acute distress  Respiratory: Clear to ausculation bilaterally, breathing well on RA  Cardiovascular: regular rate and rhythm. CP nonreproducible on exam.   Gastrointestinal: soft, non tender, positive bowel sounds, no rebound or guarding   Pelvic exam: deferred per patient  Extremities: no calf tenderness or swelling, negative Halley sign    LABS:                        12.2   8.70  )-----------( 263      ( 06 Dec 2023 16:51 )             35.6     12-06    132<L>  |  97  |  11  ----------------------------<  85  4.8   |  23  |  0.64    Ca    9.7      06 Dec 2023 16:51  Mg     1.9     12-06      PT/INR - ( 06 Dec 2023 16:51 )   PT: 11.8 sec;   INR: 1.04          PTT - ( 06 Dec 2023 16:51 )  PTT:31.1 sec  Urinalysis Basic - ( 06 Dec 2023 16:51 )    Color: x / Appearance: x / SG: x / pH: x  Gluc: 85 mg/dL / Ketone: x  / Bili: x / Urobili: x   Blood: x / Protein: x / Nitrite: x   Leuk Esterase: x / RBC: x / WBC x   Sq Epi: x / Non Sq Epi: x / Bacteria: x    RADIOLOGY & ADDITIONAL STUDIES:    ACC: [id number] EXAM:  CT ANGIO CHEST PULM ART WAWIC   ORDERED BY: ALIDA   AGNESHARLAN     PROCEDURE DATE:  2023          INTERPRETATION:  CLINICAL INFORMATION: Recent laparoscopic hysterectomy,   ovarian cystectomy, flexible sigmoidoscopy.    COMPARISON: None.    CONTRAST/COMPLICATIONS:  IV Contrast: Isovue 370  70 cc administered   30 cc discarded  Oral Contrast: NONE  Complications: None reported at time of study completion    PROCEDURE:  CT Angiography of the Chest.  Sagittal and coronal reformats were performed as well as 3D (MIP)   reconstructions.    FINDINGS:    LUNGS AND AIRWAYS: Patent central airways.  Lungs are clear. Mosaic   attenuation in both lung bases, may be due to air trapping.  PLEURA: No pleural effusion.  MEDIASTINUM AND MIMI: No lymphadenopathy.  VESSELS: No pulmonary embolism.  HEART: Heart size is normal. No pericardial effusion.  CHEST WALL AND LOWER NECK: There is a 4.1 x 4.2 cm circumscribed fluid   density lesion in the lower outer right breast. Additional 1.2cm   partially peripherally calcified circumscribed lesion in the right   breast. Few foci of gas in the left chest wall/breast, most likely   postoperative.  VISUALIZED UPPER ABDOMEN: Within normal limits.  BONES: Within normal limits.    IMPRESSION:    1. No pulmonary embolism.    2. Two right breast lesions, a 4.2cm circumscribed fluid attenuation   lesion and a 1.2cm partially peripherally calcified circumscribed lesion,   should be further assessed with mammography and dedicated breast   ultrasound.        --- End of Report ---          JAIR MIRELES MD; Resident Radiologist  This document has been electronically signed.  ALBA CAMPBELL MD; Attending Radiologist  This document has been electronically signed. Dec  6 2023  6:21PM   47yo  POD6 s/p RA TLH, BS, right ovarian cystectomy, peritoneal inclusion cystectomy, flexible sigmoidoscopy with negative air leak test, and LORETTA drain placement for endometriosis, adenomyosis, and chronic pelvic pain, presenting for SOB and chest pain. The surgery was c/b significant adhesions and general surgery assisted in lysis of adhesions. EBL 500cc, post op Hb 10.7. The post operative course was c/b significant hand swelling from IV infiltration (resolved) and a corneal abrasion (resolved). She was discharged home on POD2, ambulating without assistance, voiding, tolerating PO, passing flatus, LORETTA drain removed with minimal output, and VSS. She reports that pain has been well controlled at home with Tylenol/Motrin and she has continued to meet all post op milestones, having regular BM at this time. She states that while she was inpatient she noticed occasional L abdominal pain with deep inspiration which she attributed to post operative pain and the placement of LORETTA drain on the L side. However, when she went home, she noted that this pain persistent with deep inhalation in the upper left quadrant/left chest while her generalized abdominal pain was well controlled. She also noted that she began to feel SOB easily while talking and ambulating, and that she felt her heart racing. She denies having these sx prior to surgery. She reports that she notes elevated HR to 120s-130s while ambulating (per HR on apple watch) and baseline around 115. She denies headache, CP, N/V, constipation, dysuria, fever, chills, vaginal bleeding or discharge.     OB: , G1  , G2 pCS   GYN: hx of endometriosis, adenomyosis, chronic pelvic pain. Denies hx of fibroids, stds, abnormal pap smears.   PMH: Hashimoto's, HLD  PSH:  - R club foot surgery  - open appendectomy   - pCS  - abdominal myomectomy  - R meniscectomy   - L/S ovarian cystectomy   - L meniscectomy   - RA TLH, BS, right ovarian cystectomy, peritoneal inclusion cystectomy, flexible sigmoidoscopy with negative air leak test  Meds:   -Spironolactone 25mg qd (hair loss)  -Synthroid 125 mcgs qd  -Atorvastatin 20mg qd   Allergies: NKDA    PHYSICAL EXAM:   Vital Signs Last 24 Hrs  T(C): 37.6 (06 Dec 2023 18:18), Max: 37.6 (06 Dec 2023 18:18)  T(F): 99.6 (06 Dec 2023 18:18), Max: 99.6 (06 Dec 2023 18:18)  HR: 90 (06 Dec 2023 18:18) (90 - 114)  BP: 102/56 (06 Dec 2023 18:18) (102/56 - 119/77)  RR: 18 (06 Dec 2023 18:18) (18 - 18)  SpO2: 96% (06 Dec 2023 18:18) (96% - 98%)    Parameters below as of 06 Dec 2023 18:18  Patient On (Oxygen Delivery Method): room air    **************************  Constitutional: No acute distress  Respiratory: Clear to ausculation bilaterally, breathing well on RA  Cardiovascular: regular rate and rhythm. CP nonreproducible on exam.   Gastrointestinal: soft, non tender, positive bowel sounds, no rebound or guarding   Pelvic exam: deferred per patient  Extremities: no calf tenderness or swelling, negative Halley sign    LABS:                        12.2   8.70  )-----------( 263      ( 06 Dec 2023 16:51 )             35.6     12-06    132<L>  |  97  |  11  ----------------------------<  85  4.8   |  23  |  0.64    Ca    9.7      06 Dec 2023 16:51  Mg     1.9     12-06      PT/INR - ( 06 Dec 2023 16:51 )   PT: 11.8 sec;   INR: 1.04          PTT - ( 06 Dec 2023 16:51 )  PTT:31.1 sec  Urinalysis Basic - ( 06 Dec 2023 16:51 )    Color: x / Appearance: x / SG: x / pH: x  Gluc: 85 mg/dL / Ketone: x  / Bili: x / Urobili: x   Blood: x / Protein: x / Nitrite: x   Leuk Esterase: x / RBC: x / WBC x   Sq Epi: x / Non Sq Epi: x / Bacteria: x    RADIOLOGY & ADDITIONAL STUDIES:    ACC: [id number] EXAM:  CT ANGIO CHEST PULM ART WAWIC   ORDERED BY: ALIDA   AGNESHARLAN     PROCEDURE DATE:  2023          INTERPRETATION:  CLINICAL INFORMATION: Recent laparoscopic hysterectomy,   ovarian cystectomy, flexible sigmoidoscopy.    COMPARISON: None.    CONTRAST/COMPLICATIONS:  IV Contrast: Isovue 370  70 cc administered   30 cc discarded  Oral Contrast: NONE  Complications: None reported at time of study completion    PROCEDURE:  CT Angiography of the Chest.  Sagittal and coronal reformats were performed as well as 3D (MIP)   reconstructions.    FINDINGS:    LUNGS AND AIRWAYS: Patent central airways.  Lungs are clear. Mosaic   attenuation in both lung bases, may be due to air trapping.  PLEURA: No pleural effusion.  MEDIASTINUM AND MIMI: No lymphadenopathy.  VESSELS: No pulmonary embolism.  HEART: Heart size is normal. No pericardial effusion.  CHEST WALL AND LOWER NECK: There is a 4.1 x 4.2 cm circumscribed fluid   density lesion in the lower outer right breast. Additional 1.2cm   partially peripherally calcified circumscribed lesion in the right   breast. Few foci of gas in the left chest wall/breast, most likely   postoperative.  VISUALIZED UPPER ABDOMEN: Within normal limits.  BONES: Within normal limits.    IMPRESSION:    1. No pulmonary embolism.    2. Two right breast lesions, a 4.2cm circumscribed fluid attenuation   lesion and a 1.2cm partially peripherally calcified circumscribed lesion,   should be further assessed with mammography and dedicated breast   ultrasound.        --- End of Report ---          JAIR MIRELES MD; Resident Radiologist  This document has been electronically signed.  ALBA CAMPBELL MD; Attending Radiologist  This document has been electronically signed. Dec  6 2023  6:21PM

## 2023-12-06 NOTE — ED PROVIDER NOTE - NS ED ROS FT
Constitutional: No fever or chills  Eyes: No discharge or drainage  Ears, Nose, Mouth, Throat: No nasal discharge, no sore throat  Cardiovascular: +chest pain, no palpitations  Respiratory: +shortness of breath, no cough  Gastrointestinal: No nausea or vomiting, no abdominal pain, no diarrhea or constipation  Musculoskeletal: No joint pain, no swelling  Skin: No rashes or lesions  Neurological: No numbness, weakness, tingling, no headache  Psychiatric: No depression

## 2023-12-06 NOTE — ED PROVIDER NOTE - PATIENT PORTAL LINK FT
You can access the FollowMyHealth Patient Portal offered by [facility] by registering at the following website: [email or web address]. By joining [employer]’s FollowMyHealth portal, you will also be able to view your health information using other applications (apps) compatible with our system. You can access the FollowMyHealth Patient Portal offered by [facility] by registering at the following website: [email or web address]. By joining [email or web address]’s FollowMyHealth portal, you will also be able to view your health information using other applications (apps) compatible with our system.

## 2023-12-06 NOTE — ED PROVIDER NOTE - NSFOLLOWUPINSTRUCTIONS_ED_ALL_ED_FT
Please stay well hydrated, return for worsening symptoms, shortness of breath, lightheadedness, dizziness. Please follow up closely with your OBGYN.     I hope you feel better soon, happy holidays!    Sincerely,  Ralph Reaves MD

## 2023-12-06 NOTE — ED PROVIDER NOTE - PROGRESS NOTE DETAILS
Pt aware of breast cysts, gets regular mammograms. HR resolved. EFAST performed, negative, abd soft and nontender. No B-lines on US, no pericardial effusion, grossly normal EF. Discussed with GYN attending, will dc with return precautions.

## 2023-12-06 NOTE — ED PROVIDER NOTE - OBJECTIVE STATEMENT
48 year old female with ho Hashimotos RA TLH, BS, R ovarian cystectomy, L peritoneal inclusion cystectomy, flex sigmoidoscopy for endometriosis and adenomyosis 11/30 presenting with several days of pleuritic L sided cp with some associated sob, CANTRELL. No fever, chills, cough. No recent travel, no history of dvt or pe. ROS as above.

## 2023-12-06 NOTE — CONSULT NOTE ADULT - ASSESSMENT
49yo  POD6 s/p RA TLH, BS, right ovarian cystectomy, peritoneal inclusion cystectomy, flexible sigmoidoscopy with negative air leak test, and LORETTA drain placement for endometriosis, adenomyosis, and chronic pelvic pain, presenting for SOB and pleuritic chest pain. Initially in ED, patient was tachycardic to 114. EKG demonstrated sinus tachycardia with ST changes and CTA PE protocol completed, negative for PE. Bedside sono by ED attending wnl, low concern for pericardial effusion. Tachycardia improved to 80s-90s throughout assessment and patient did not require pain medications.   -VSS, afebrile. Tachycardia improved.   -Hemodynamically and clinically stable.   -Labs wnl - Hb 12.2, no leukocytosis. BMP wnl, mild hyponatremia. Normal troponins and pro-BNP.   -CTA Chest PE protocol as above - no PE visualized. Significant for 4.2cm circumscribed fluid attenuation lesion and a 1.2cm partially peripherally calcified circumscribed lesion. Pt needs f/u mammogram.   -Meeting all post operative milestones  -Recommend pt f/u with Cardiologist and endocrinologist (may need adjustment of Levothyroxine dosing).   -No acute GYN intervention at this time.   -Pt to follow up with Dr. Soares for post op check this week.     Carlos More PGY2  D/w Dr. Soares and Dr. Stringer PGY3   47yo  POD6 s/p RA TLH, BS, right ovarian cystectomy, peritoneal inclusion cystectomy, flexible sigmoidoscopy with negative air leak test, and LORETTA drain placement for endometriosis, adenomyosis, and chronic pelvic pain, presenting for SOB and pleuritic chest pain. Initially in ED, patient was tachycardic to 114. EKG demonstrated sinus tachycardia with ST changes and CTA PE protocol completed, negative for PE. Bedside sono by ED attending wnl, low concern for pericardial effusion. Tachycardia improved to 80s-90s throughout assessment and patient did not require pain medications.   -VSS, afebrile. Tachycardia improved.   -Hemodynamically and clinically stable.   -Labs wnl - Hb 12.2, no leukocytosis. BMP wnl, mild hyponatremia. Normal troponins and pro-BNP.   -CTA Chest PE protocol as above - no PE visualized. Significant for 4.2cm circumscribed fluid attenuation lesion and a 1.2cm partially peripherally calcified circumscribed lesion. Pt needs f/u mammogram.   -Meeting all post operative milestones  -Recommend pt f/u with Cardiologist and endocrinologist (may need adjustment of Levothyroxine dosing).   -No acute GYN intervention at this time.   -Pt to follow up with Dr. Soares for post op check this week.     Carlos More PGY2  D/w Dr. Soares and Dr. Stringer PGY3

## 2023-12-12 LAB
SURGICAL PATHOLOGY STUDY: SIGNIFICANT CHANGE UP
SURGICAL PATHOLOGY STUDY: SIGNIFICANT CHANGE UP

## 2024-10-16 NOTE — HISTORY OF PRESENT ILLNESS
Patient: Vilma Grover    Procedure Information       Date/Time: 10/16/24 0815    Procedure: Tendon transfers to restore finger and thumb extension left forearm / 75 Minutes (Tendon Braiding Forceps) (Left: Hand)    Location: [facility] /  [facility]    Surgeons: Talon Gregorio MD          Past Medical History:   Diagnosis Date    Abnormal Pap smear of cervix     Anxiety     Colon polyps     Depression     Diabetes (Multi)     High cholesterol     Hypertension     Obesity     Sleep apnea     Urinary tract infection     Varicella      Past Surgical History:   Procedure Laterality Date    APPENDECTOMY      BREAST BIOPSY Right     2018 right stereo bx benign through ccl     SECTION, CLASSIC       Section     SECTION, CLASSIC       SECTION, CLASSIC      baby passed away    CONDYLOMA EXCISION/FULGURATION      OTHER SURGICAL HISTORY  10/23/2014    Bunion Correction By [place] Procedure       Relevant Problems   Anesthesia (within normal limits)      Cardiac   (+) Benign essential hypertension   (+) Hypercholesteremia   (+) Hyperlipidemia   (+) Hypertension      Pulmonary   (+) Obstructive sleep apnea syndrome      Neuro   (+) Anxiety disorder   (+) Depression      Endocrine   (+) Diabetes mellitus type 2 in obese   (+) Obesity, morbid, BMI 50 or higher (Multi)   (+) Type 2 diabetes mellitus without complication, without long-term current use of insulin (Multi)       Clinical information reviewed:   Tobacco  Allergies  Meds   Med Hx  Surg Hx   Fam Hx  Soc Hx        NPO Detail:  NPO/Void Status  Date of Last Liquid: 10/15/24  Time of Last Liquid:   Date of Last Solid: 10/15/24  Time of Last Solid:          Physical Exam    Airway  Mallampati: II  TM distance: >3 FB  Neck ROM: full     Cardiovascular   Comments: deferred   Dental   Comments: No loose teeth   Pulmonary   Comments: deferred   Abdominal     Comments: deferred           Anesthesia Plan    History  of general anesthesia?: yes  History of complications of general anesthesia?: no    ASA 3     general   (ETT RSI)  The patient is not a current smoker.  Patient was not previously instructed to abstain from smoking on day of procedure.  Patient did not smoke on day of procedure.  Education provided regarding risk of obstructive sleep apnea.  intravenous induction   Postoperative administration of opioids is intended.  Anesthetic plan and risks discussed with patient.  Use of blood products discussed with patient who consented to blood products.    Plan discussed with CRNA and CAA.       [FreeTextEntry1] : Problem\par 1)Fibroids\par 2) Pelvic Mass\par \par Previous Therapy\par 1)D&C 7/30/18\par    a)Endocervix- Mucus, blood, scant trips of squamous epithelium\par    b)Endometrium- Fragments of benign endocervix. Only occasional strips surface endometrium are present. Endometrial tissue is not represented well enough for evaluation in this material. \par 2)S/p Uterine Artery Embolization (UAE) by Dr. Soren Conway for menorrhagia 2' to leiomyomata 9/6/18\par 3)Pap 1/11/19\par    a)NILM, HPV/HR negative \par 4)Ca-125= 21\par    CEA= <0.5 both from 1/11/19\par 5) [place] 3[place]\par 6) CT Pelvis 10/15/19\par     a) large cystic structure in the L adnexa 10cm\par 7) Laparoscopic lysis of adhesions and evacuation of peritoneal inclusion cyst\par    a) no pathology sent\par \par \par Here for 1 month post op, feeling well. \par \par \par

## 2025-07-07 NOTE — DISCHARGE NOTE PROVIDER - PROVIDER TOKENS
Quality 131: Pain Assessment And Follow-Up: Pain assessment using a standardized tool is documented as negative, no follow-up plan required PROVIDER:[TOKEN:[[id number]]] Quality 431: Preventive Care And Screening: Unhealthy Alcohol Use - Screening: Patient screened for unhealthy alcohol use using a single question and scores less than 2 times per year Quality 110: Preventive Care And Screening: Influenza Immunization: Influenza Immunization Administered during Influenza season Quality 226: Preventive Care And Screening: Tobacco Use: Screening And Cessation Intervention: Patient screened for tobacco and never smoked Quality 111:Pneumonia Vaccination Status For Older Adults: Pneumococcal Vaccination Previously Received Quality 400a: One-Time Screening For Hepatitis C Virus (Hcv) For All Patients: Patient received one-time screening for HCV infection Detail Level: Detailed Quality 130: Documentation Of Current Medications In The Medical Record: Current Medications Documented none

## (undated) DEVICE — XI ARM NEEDLE DRIVER LARGE

## (undated) DEVICE — TIP METZENBAUM SCISSOR MONOPOLAR ENDOCUT (ORANGE)

## (undated) DEVICE — FOLEY TRAY 16FR 5CC LF UMETER CLOSED

## (undated) DEVICE — SUT VICRYL 0 27" UR-6

## (undated) DEVICE — XI DRAPE COLUMN

## (undated) DEVICE — INSUFFLATION NDL COVIDIEN SURGINEEDLE VERESS 120MM

## (undated) DEVICE — Device

## (undated) DEVICE — TROCAR APPLIED MEDICAL KII FIOS FIRST ENTRY 12MM X 100MM ADVANCED FIXATION

## (undated) DEVICE — PACK GYN WDC

## (undated) DEVICE — GLV 8 PROTEXIS (WHITE)

## (undated) DEVICE — UTERINE MANIPULATOR CONMED VCARE MED 34MM

## (undated) DEVICE — XI TIP COVER

## (undated) DEVICE — XI OBTURATOR OPTICAL BLADELESS 8MM

## (undated) DEVICE — KIT SIGMOIDOSCOPE NO SWAB SGL USE

## (undated) DEVICE — VENODYNE/SCD SLEEVE CALF MEDIUM

## (undated) DEVICE — DRAPE INSTRUMENT POUCH 10" X 18"

## (undated) DEVICE — XI SEAL UNIV 5- 8 MM

## (undated) DEVICE — DRSG DERMABOND 0.7ML

## (undated) DEVICE — RUMI COLPO-PNEUMO OCCLUDER

## (undated) DEVICE — TUBING STRYKER PNEUMOCLEAR SMOKE EVACUATION HIGH FLOW

## (undated) DEVICE — XI ARM SCISSOR MONO CURVED

## (undated) DEVICE — UTERINE MANIPULATOR CONMED VCARE LG 37MM

## (undated) DEVICE — SUT MONOCRYL 4-0 27" PS-2 UNDYED

## (undated) DEVICE — SUT VLOC 180 0 9" GS-21 GREEN

## (undated) DEVICE — TROCAR APPLIED MEDICAL KII BALLOON BLUNT TIP 12MM X 100MM

## (undated) DEVICE — XI DRAPE ARM

## (undated) DEVICE — XI ARM FORCEP FENESTRATED BIPOLAR 8MM

## (undated) DEVICE — XI ARM FORCEP TENACULUM